# Patient Record
Sex: MALE | Race: WHITE | NOT HISPANIC OR LATINO | Employment: FULL TIME | URBAN - METROPOLITAN AREA
[De-identification: names, ages, dates, MRNs, and addresses within clinical notes are randomized per-mention and may not be internally consistent; named-entity substitution may affect disease eponyms.]

---

## 2018-04-12 ENCOUNTER — RECORDS - HEALTHEAST (OUTPATIENT)
Dept: LAB | Facility: CLINIC | Age: 33
End: 2018-04-12

## 2018-04-12 LAB
ALBUMIN SERPL-MCNC: 4.3 G/DL (ref 3.5–5)
ALP SERPL-CCNC: 51 U/L (ref 45–120)
ALT SERPL W P-5'-P-CCNC: 29 U/L (ref 0–45)
ANION GAP SERPL CALCULATED.3IONS-SCNC: 14 MMOL/L (ref 5–18)
AST SERPL W P-5'-P-CCNC: 28 U/L (ref 0–40)
BILIRUB SERPL-MCNC: 0.6 MG/DL (ref 0–1)
BUN SERPL-MCNC: 14 MG/DL (ref 8–22)
CALCIUM SERPL-MCNC: 10.1 MG/DL (ref 8.5–10.5)
CHLORIDE BLD-SCNC: 101 MMOL/L (ref 98–107)
CHOLEST SERPL-MCNC: 219 MG/DL
CO2 SERPL-SCNC: 23 MMOL/L (ref 22–31)
CREAT SERPL-MCNC: 0.9 MG/DL (ref 0.7–1.3)
FASTING STATUS PATIENT QL REPORTED: ABNORMAL
GFR SERPL CREATININE-BSD FRML MDRD: >60 ML/MIN/1.73M2
GLUCOSE BLD-MCNC: 86 MG/DL (ref 70–125)
HDLC SERPL-MCNC: 71 MG/DL
LDLC SERPL CALC-MCNC: 120 MG/DL
POTASSIUM BLD-SCNC: 3.6 MMOL/L (ref 3.5–5)
PROT SERPL-MCNC: 8.4 G/DL (ref 6–8)
SODIUM SERPL-SCNC: 138 MMOL/L (ref 136–145)
TRIGL SERPL-MCNC: 139 MG/DL

## 2018-04-13 ENCOUNTER — RECORDS - HEALTHEAST (OUTPATIENT)
Dept: LAB | Facility: CLINIC | Age: 33
End: 2018-04-13

## 2018-04-17 LAB
ALBUMIN PERCENT: 63.3 % (ref 51–67)
ALBUMIN SERPL ELPH-MCNC: 5.3 G/DL (ref 3.2–4.7)
ALPHA 1 PERCENT: 2 % (ref 2–4)
ALPHA 2 PERCENT: 9.3 % (ref 5–13)
ALPHA1 GLOB SERPL ELPH-MCNC: 0.2 G/DL (ref 0.1–0.3)
ALPHA2 GLOB SERPL ELPH-MCNC: 0.8 G/DL (ref 0.4–0.9)
B-GLOBULIN SERPL ELPH-MCNC: 0.9 G/DL (ref 0.7–1.2)
BETA PERCENT: 10.7 % (ref 10–17)
GAMMA GLOB SERPL ELPH-MCNC: 1.2 G/DL (ref 0.6–1.4)
GAMMA GLOBULIN PERCENT: 14.7 % (ref 9–20)
PATH ICD:: ABNORMAL
PROT PATTERN SERPL ELPH-IMP: ABNORMAL
PROT SERPL-MCNC: 8.4 G/DL (ref 6–8)
REVIEWING PATHOLOGIST: ABNORMAL

## 2019-08-19 ENCOUNTER — HOSPITAL ENCOUNTER (OUTPATIENT)
Facility: CLINIC | Age: 34
End: 2019-08-19
Attending: ORTHOPAEDIC SURGERY | Admitting: ORTHOPAEDIC SURGERY
Payer: COMMERCIAL

## 2019-08-19 ENCOUNTER — RECORDS - HEALTHEAST (OUTPATIENT)
Dept: LAB | Facility: CLINIC | Age: 34
End: 2019-08-19

## 2019-08-19 LAB
ANION GAP SERPL CALCULATED.3IONS-SCNC: 10 MMOL/L (ref 5–18)
BUN SERPL-MCNC: 6 MG/DL (ref 8–22)
CALCIUM SERPL-MCNC: 10 MG/DL (ref 8.5–10.5)
CHLORIDE BLD-SCNC: 103 MMOL/L (ref 98–107)
CO2 SERPL-SCNC: 25 MMOL/L (ref 22–31)
CREAT SERPL-MCNC: 0.79 MG/DL (ref 0.7–1.3)
GFR SERPL CREATININE-BSD FRML MDRD: >60 ML/MIN/1.73M2
GLUCOSE BLD-MCNC: 105 MG/DL (ref 70–125)
POTASSIUM BLD-SCNC: 4.1 MMOL/L (ref 3.5–5)
SODIUM SERPL-SCNC: 138 MMOL/L (ref 136–145)

## 2019-08-20 ENCOUNTER — ANESTHESIA EVENT (OUTPATIENT)
Dept: SURGERY | Facility: CLINIC | Age: 34
End: 2019-08-20

## 2019-08-20 RX ORDER — OXYCODONE HYDROCHLORIDE 5 MG/1
5 CAPSULE ORAL EVERY 4 HOURS PRN
COMMUNITY
End: 2019-08-21 | Stop reason: HOSPADM

## 2019-08-20 RX ORDER — OMEGA-3 FATTY ACIDS/FISH OIL 300-1000MG
200 CAPSULE ORAL EVERY 4 HOURS PRN
COMMUNITY
End: 2019-08-21 | Stop reason: HOSPADM

## 2019-08-20 RX ORDER — ACETAMINOPHEN 500 MG
500-1000 TABLET ORAL EVERY 6 HOURS PRN
COMMUNITY
End: 2019-08-21 | Stop reason: HOSPADM

## 2019-08-20 RX ORDER — GABAPENTIN 300 MG/1
300 CAPSULE ORAL ONCE
Status: CANCELLED | OUTPATIENT
Start: 2019-08-20 | End: 2019-08-20

## 2019-08-20 RX ORDER — ACETAMINOPHEN 325 MG/1
975 TABLET ORAL ONCE
Status: CANCELLED | OUTPATIENT
Start: 2019-08-20 | End: 2019-08-20

## 2019-08-20 RX ORDER — LIDOCAINE 40 MG/G
CREAM TOPICAL
Status: CANCELLED | OUTPATIENT
Start: 2019-08-20

## 2019-08-20 RX ORDER — SODIUM CHLORIDE, SODIUM LACTATE, POTASSIUM CHLORIDE, CALCIUM CHLORIDE 600; 310; 30; 20 MG/100ML; MG/100ML; MG/100ML; MG/100ML
INJECTION, SOLUTION INTRAVENOUS CONTINUOUS
Status: CANCELLED | OUTPATIENT
Start: 2019-08-20

## 2019-08-20 NOTE — ANESTHESIA PREPROCEDURE EVALUATION
Anesthesia Pre-Procedure Evaluation    Patient: Deangelo Leahy   MRN: [id number] : 1985          Preoperative Diagnosis: tibial plateau fracture    Procedure(s):  OPEN REDUCTION INTERNAL FIXATION of TIBIA  Possible KNEE Arthroscopy    No past medical history on file.  No past surgical history on file.    Anesthesia Evaluation     .             ROS/MED HX    ENT/Pulmonary:     (+)asthma , . .    Neurologic:     (+)other neuro insomnia    Cardiovascular:     (+) hypertension----. : . . . :. .       METS/Exercise Tolerance:     Hematologic:         Musculoskeletal:   (+) fracture lower extremity: Other (Comment) (left tibial plateau fracture), -       GI/Hepatic:         Renal/Genitourinary:         Endo:         Psychiatric:         Infectious Disease:         Malignancy:         Other:                                 No results found for: WBC, HGB, HCT, PLT, CRP, SED, NA, POTASSIUM, CHLORIDE, CO2, BUN, CR, GLC, JUAN RAMON, PHOS, MAG, ALBUMIN, PROTTOTAL, ALT, AST, GGT, ALKPHOS, BILITOTAL, BILIDIRECT, LIPASE, AMYLASE, ALEX, PTT, INR, FIBR, TSH, T4, T3, HCG, HCGS, CKTOTAL, CKMB, TROPN    Preop Vitals  BP Readings from Last 3 Encounters:   No data found for BP    Pulse Readings from Last 3 Encounters:   No data found for Pulse      Resp Readings from Last 3 Encounters:   No data found for Resp    SpO2 Readings from Last 3 Encounters:   No data found for SpO2      Temp Readings from Last 1 Encounters:   No data found for Temp    Ht Readings from Last 1 Encounters:   No data found for Ht      Wt Readings from Last 1 Encounters:   No data found for Wt    There is no height or weight on file to calculate BMI.                   GIBRAN Gant CRNA

## 2019-08-21 ENCOUNTER — ANESTHESIA (OUTPATIENT)
Dept: SURGERY | Facility: CLINIC | Age: 34
End: 2019-08-21

## 2019-08-21 ENCOUNTER — TRANSFERRED RECORDS (OUTPATIENT)
Dept: SURGERY | Facility: CLINIC | Age: 34
End: 2019-08-21

## 2019-08-29 RX ORDER — CIPROFLOXACIN 500 MG/1
500 TABLET, FILM COATED ORAL 2 TIMES DAILY
Status: ON HOLD | COMMUNITY
End: 2019-08-30

## 2019-08-29 RX ORDER — MULTIPLE VITAMINS W/ MINERALS TAB 9MG-400MCG
1 TAB ORAL DAILY
COMMUNITY

## 2019-08-29 RX ORDER — IBUPROFEN 200 MG
200 TABLET ORAL EVERY 6 HOURS PRN
Status: ON HOLD | COMMUNITY
End: 2019-08-31

## 2019-08-30 ENCOUNTER — ANESTHESIA EVENT (OUTPATIENT)
Dept: SURGERY | Facility: CLINIC | Age: 34
End: 2019-08-30
Payer: COMMERCIAL

## 2019-08-30 ENCOUNTER — HOSPITAL ENCOUNTER (OUTPATIENT)
Facility: CLINIC | Age: 34
Setting detail: OBSERVATION
Discharge: HOME | End: 2019-08-31
Attending: ORTHOPAEDIC SURGERY | Admitting: ORTHOPAEDIC SURGERY
Payer: COMMERCIAL

## 2019-08-30 ENCOUNTER — APPOINTMENT (OUTPATIENT)
Dept: GENERAL RADIOLOGY | Facility: CLINIC | Age: 34
End: 2019-08-30
Attending: ORTHOPAEDIC SURGERY
Payer: COMMERCIAL

## 2019-08-30 ENCOUNTER — APPOINTMENT (OUTPATIENT)
Dept: GENERAL RADIOLOGY | Facility: CLINIC | Age: 34
End: 2019-08-30
Attending: PHYSICIAN ASSISTANT
Payer: COMMERCIAL

## 2019-08-30 ENCOUNTER — ANESTHESIA (OUTPATIENT)
Dept: SURGERY | Facility: CLINIC | Age: 34
End: 2019-08-30
Payer: COMMERCIAL

## 2019-08-30 DIAGNOSIS — S82.142A CLOSED FRACTURE OF LEFT TIBIAL PLATEAU, INITIAL ENCOUNTER: Primary | ICD-10-CM

## 2019-08-30 PROBLEM — S82.143A TIBIAL PLATEAU FRACTURE: Status: ACTIVE | Noted: 2019-08-30

## 2019-08-30 PROCEDURE — 37000008 ZZH ANESTHESIA TECHNICAL FEE, 1ST 30 MIN: Performed by: ORTHOPAEDIC SURGERY

## 2019-08-30 PROCEDURE — 25000125 ZZHC RX 250: Performed by: ORTHOPAEDIC SURGERY

## 2019-08-30 PROCEDURE — 25000128 H RX IP 250 OP 636: Performed by: ANESTHESIOLOGY

## 2019-08-30 PROCEDURE — 25000125 ZZHC RX 250: Performed by: NURSE ANESTHETIST, CERTIFIED REGISTERED

## 2019-08-30 PROCEDURE — 40000985 XR KNEE LT 1/2 VW: Mod: 50

## 2019-08-30 PROCEDURE — 40000985 XR KNEE PORT RT 1/2 VW: Mod: RT

## 2019-08-30 PROCEDURE — 37000009 ZZH ANESTHESIA TECHNICAL FEE, EACH ADDTL 15 MIN: Performed by: ORTHOPAEDIC SURGERY

## 2019-08-30 PROCEDURE — 25800030 ZZH RX IP 258 OP 636: Performed by: NURSE ANESTHETIST, CERTIFIED REGISTERED

## 2019-08-30 PROCEDURE — 27211024 ZZHC OR SUPPLY OTHER OPNP: Performed by: ORTHOPAEDIC SURGERY

## 2019-08-30 PROCEDURE — 36000065 ZZH SURGERY LEVEL 4 W FLUORO 1ST 30 MIN: Performed by: ORTHOPAEDIC SURGERY

## 2019-08-30 PROCEDURE — 25000132 ZZH RX MED GY IP 250 OP 250 PS 637: Performed by: PHYSICIAN ASSISTANT

## 2019-08-30 PROCEDURE — C1713 ANCHOR/SCREW BN/BN,TIS/BN: HCPCS | Performed by: ORTHOPAEDIC SURGERY

## 2019-08-30 PROCEDURE — 36000063 ZZH SURGERY LEVEL 4 EA 15 ADDTL MIN: Performed by: ORTHOPAEDIC SURGERY

## 2019-08-30 PROCEDURE — C1762 CONN TISS, HUMAN(INC FASCIA): HCPCS | Performed by: ORTHOPAEDIC SURGERY

## 2019-08-30 PROCEDURE — 25000128 H RX IP 250 OP 636: Performed by: NURSE ANESTHETIST, CERTIFIED REGISTERED

## 2019-08-30 PROCEDURE — 71000012 ZZH RECOVERY PHASE 1 LEVEL 1 FIRST HR: Performed by: ORTHOPAEDIC SURGERY

## 2019-08-30 PROCEDURE — 25000132 ZZH RX MED GY IP 250 OP 250 PS 637: Performed by: NURSE ANESTHETIST, CERTIFIED REGISTERED

## 2019-08-30 PROCEDURE — 25000128 H RX IP 250 OP 636: Performed by: PHYSICIAN ASSISTANT

## 2019-08-30 PROCEDURE — 25800030 ZZH RX IP 258 OP 636: Performed by: ANESTHESIOLOGY

## 2019-08-30 PROCEDURE — 27210794 ZZH OR GENERAL SUPPLY STERILE: Performed by: ORTHOPAEDIC SURGERY

## 2019-08-30 PROCEDURE — 40000171 ZZH STATISTIC PRE-PROCEDURE ASSESSMENT III: Performed by: ORTHOPAEDIC SURGERY

## 2019-08-30 PROCEDURE — 71000013 ZZH RECOVERY PHASE 1 LEVEL 1 EA ADDTL HR: Performed by: ORTHOPAEDIC SURGERY

## 2019-08-30 PROCEDURE — G0378 HOSPITAL OBSERVATION PER HR: HCPCS

## 2019-08-30 PROCEDURE — 40000277 XR SURGERY CARM FLUORO LESS THAN 5 MIN W STILLS

## 2019-08-30 DEVICE — IMP SCR SYN CORTEX 3.5X40MM SELF TAP SS 204.840: Type: IMPLANTABLE DEVICE | Site: TIBIA | Status: FUNCTIONAL

## 2019-08-30 DEVICE — IMPLANTABLE DEVICE: Type: IMPLANTABLE DEVICE | Site: TIBIA | Status: FUNCTIONAL

## 2019-08-30 DEVICE — IMP SCR SYN CORTEX 3.5X32MM SELF TAP SS 204.832: Type: IMPLANTABLE DEVICE | Site: TIBIA | Status: FUNCTIONAL

## 2019-08-30 DEVICE — IMP SCR SYN 3.5X20MM LOCKING W/STARDRIVE SS 212.106: Type: IMPLANTABLE DEVICE | Site: TIBIA | Status: FUNCTIONAL

## 2019-08-30 DEVICE — IMP SCR SYN CORTEX 3.5X36MM SELF TAP SS 204.836: Type: IMPLANTABLE DEVICE | Site: TIBIA | Status: FUNCTIONAL

## 2019-08-30 DEVICE — GRAFT BONE CHIPS CANC 15ML 400145: Type: IMPLANTABLE DEVICE | Site: TIBIA | Status: FUNCTIONAL

## 2019-08-30 RX ORDER — NALOXONE HYDROCHLORIDE 0.4 MG/ML
.1-.4 INJECTION, SOLUTION INTRAMUSCULAR; INTRAVENOUS; SUBCUTANEOUS
Status: DISCONTINUED | OUTPATIENT
Start: 2019-08-30 | End: 2019-08-30

## 2019-08-30 RX ORDER — ONDANSETRON 2 MG/ML
4 INJECTION INTRAMUSCULAR; INTRAVENOUS EVERY 30 MIN PRN
Status: DISCONTINUED | OUTPATIENT
Start: 2019-08-30 | End: 2019-08-30 | Stop reason: HOSPADM

## 2019-08-30 RX ORDER — DEXAMETHASONE SODIUM PHOSPHATE 4 MG/ML
4 INJECTION, SOLUTION INTRA-ARTICULAR; INTRALESIONAL; INTRAMUSCULAR; INTRAVENOUS; SOFT TISSUE EVERY 10 MIN PRN
Status: DISCONTINUED | OUTPATIENT
Start: 2019-08-30 | End: 2019-08-30 | Stop reason: HOSPADM

## 2019-08-30 RX ORDER — HYDROXYZINE HYDROCHLORIDE 25 MG/1
25 TABLET, FILM COATED ORAL EVERY 6 HOURS PRN
Status: DISCONTINUED | OUTPATIENT
Start: 2019-08-30 | End: 2019-08-31 | Stop reason: HOSPADM

## 2019-08-30 RX ORDER — OXYCODONE HYDROCHLORIDE 5 MG/1
5-10 TABLET ORAL
Status: DISCONTINUED | OUTPATIENT
Start: 2019-08-30 | End: 2019-08-31 | Stop reason: HOSPADM

## 2019-08-30 RX ORDER — PROCHLORPERAZINE MALEATE 5 MG
10 TABLET ORAL EVERY 6 HOURS PRN
Status: DISCONTINUED | OUTPATIENT
Start: 2019-08-30 | End: 2019-08-31 | Stop reason: HOSPADM

## 2019-08-30 RX ORDER — ONDANSETRON 4 MG/1
4 TABLET, ORALLY DISINTEGRATING ORAL EVERY 6 HOURS PRN
Status: DISCONTINUED | OUTPATIENT
Start: 2019-08-30 | End: 2019-08-31 | Stop reason: HOSPADM

## 2019-08-30 RX ORDER — BUPIVACAINE HYDROCHLORIDE 7.5 MG/ML
INJECTION, SOLUTION INTRASPINAL PRN
Status: DISCONTINUED | OUTPATIENT
Start: 2019-08-30 | End: 2019-08-30

## 2019-08-30 RX ORDER — METHOCARBAMOL 750 MG/1
750 TABLET, FILM COATED ORAL 4 TIMES DAILY PRN
Status: DISCONTINUED | OUTPATIENT
Start: 2019-08-30 | End: 2019-08-31 | Stop reason: HOSPADM

## 2019-08-30 RX ORDER — SODIUM CHLORIDE, SODIUM LACTATE, POTASSIUM CHLORIDE, CALCIUM CHLORIDE 600; 310; 30; 20 MG/100ML; MG/100ML; MG/100ML; MG/100ML
INJECTION, SOLUTION INTRAVENOUS CONTINUOUS
Status: DISCONTINUED | OUTPATIENT
Start: 2019-08-30 | End: 2019-08-30 | Stop reason: HOSPADM

## 2019-08-30 RX ORDER — ACETAMINOPHEN 500 MG
500-1000 TABLET ORAL EVERY 6 HOURS PRN
COMMUNITY

## 2019-08-30 RX ORDER — HYDRALAZINE HYDROCHLORIDE 20 MG/ML
2.5-5 INJECTION INTRAMUSCULAR; INTRAVENOUS EVERY 10 MIN PRN
Status: DISCONTINUED | OUTPATIENT
Start: 2019-08-30 | End: 2019-08-30 | Stop reason: HOSPADM

## 2019-08-30 RX ORDER — LABETALOL HYDROCHLORIDE 5 MG/ML
10 INJECTION, SOLUTION INTRAVENOUS
Status: DISCONTINUED | OUTPATIENT
Start: 2019-08-30 | End: 2019-08-30 | Stop reason: HOSPADM

## 2019-08-30 RX ORDER — FENTANYL CITRATE 50 UG/ML
25-50 INJECTION, SOLUTION INTRAMUSCULAR; INTRAVENOUS
Status: DISCONTINUED | OUTPATIENT
Start: 2019-08-30 | End: 2019-08-30 | Stop reason: HOSPADM

## 2019-08-30 RX ORDER — PROPOFOL 10 MG/ML
INJECTION, EMULSION INTRAVENOUS CONTINUOUS PRN
Status: DISCONTINUED | OUTPATIENT
Start: 2019-08-30 | End: 2019-08-30

## 2019-08-30 RX ORDER — DEXAMETHASONE SODIUM PHOSPHATE 4 MG/ML
4 INJECTION, SOLUTION INTRA-ARTICULAR; INTRALESIONAL; INTRAMUSCULAR; INTRAVENOUS; SOFT TISSUE
Status: DISCONTINUED | OUTPATIENT
Start: 2019-08-30 | End: 2019-08-30 | Stop reason: HOSPADM

## 2019-08-30 RX ORDER — ONDANSETRON 2 MG/ML
INJECTION INTRAMUSCULAR; INTRAVENOUS PRN
Status: DISCONTINUED | OUTPATIENT
Start: 2019-08-30 | End: 2019-08-30

## 2019-08-30 RX ORDER — NALOXONE HYDROCHLORIDE 0.4 MG/ML
.1-.4 INJECTION, SOLUTION INTRAMUSCULAR; INTRAVENOUS; SUBCUTANEOUS
Status: DISCONTINUED | OUTPATIENT
Start: 2019-08-30 | End: 2019-08-30 | Stop reason: HOSPADM

## 2019-08-30 RX ORDER — CEFAZOLIN SODIUM 2 G/100ML
2 INJECTION, SOLUTION INTRAVENOUS
Status: COMPLETED | OUTPATIENT
Start: 2019-08-30 | End: 2019-08-30

## 2019-08-30 RX ORDER — FENTANYL CITRATE 50 UG/ML
100 INJECTION, SOLUTION INTRAMUSCULAR; INTRAVENOUS ONCE
Status: COMPLETED | OUTPATIENT
Start: 2019-08-30 | End: 2019-08-30

## 2019-08-30 RX ORDER — ONDANSETRON 4 MG/1
4 TABLET, ORALLY DISINTEGRATING ORAL EVERY 30 MIN PRN
Status: DISCONTINUED | OUTPATIENT
Start: 2019-08-30 | End: 2019-08-30 | Stop reason: HOSPADM

## 2019-08-30 RX ORDER — CEFAZOLIN SODIUM 1 G/3ML
1 INJECTION, POWDER, FOR SOLUTION INTRAMUSCULAR; INTRAVENOUS SEE ADMIN INSTRUCTIONS
Status: DISCONTINUED | OUTPATIENT
Start: 2019-08-30 | End: 2019-08-30 | Stop reason: HOSPADM

## 2019-08-30 RX ORDER — LIDOCAINE 40 MG/G
CREAM TOPICAL
Status: DISCONTINUED | OUTPATIENT
Start: 2019-08-30 | End: 2019-08-30 | Stop reason: HOSPADM

## 2019-08-30 RX ORDER — MEPERIDINE HYDROCHLORIDE 25 MG/ML
12.5 INJECTION INTRAMUSCULAR; INTRAVENOUS; SUBCUTANEOUS
Status: DISCONTINUED | OUTPATIENT
Start: 2019-08-30 | End: 2019-08-30 | Stop reason: HOSPADM

## 2019-08-30 RX ORDER — FENTANYL CITRATE 0.05 MG/ML
25-50 INJECTION, SOLUTION INTRAMUSCULAR; INTRAVENOUS
Status: DISCONTINUED | OUTPATIENT
Start: 2019-08-30 | End: 2019-08-30 | Stop reason: HOSPADM

## 2019-08-30 RX ORDER — METOCLOPRAMIDE 5 MG/1
10 TABLET ORAL EVERY 6 HOURS PRN
Status: DISCONTINUED | OUTPATIENT
Start: 2019-08-30 | End: 2019-08-31 | Stop reason: HOSPADM

## 2019-08-30 RX ORDER — HYDROMORPHONE HYDROCHLORIDE 1 MG/ML
.3-.5 INJECTION, SOLUTION INTRAMUSCULAR; INTRAVENOUS; SUBCUTANEOUS EVERY 5 MIN PRN
Status: DISCONTINUED | OUTPATIENT
Start: 2019-08-30 | End: 2019-08-30 | Stop reason: HOSPADM

## 2019-08-30 RX ORDER — NALOXONE HYDROCHLORIDE 0.4 MG/ML
.1-.4 INJECTION, SOLUTION INTRAMUSCULAR; INTRAVENOUS; SUBCUTANEOUS
Status: DISCONTINUED | OUTPATIENT
Start: 2019-08-30 | End: 2019-08-31 | Stop reason: HOSPADM

## 2019-08-30 RX ORDER — HYDROMORPHONE HYDROCHLORIDE 1 MG/ML
.3-.5 INJECTION, SOLUTION INTRAMUSCULAR; INTRAVENOUS; SUBCUTANEOUS
Status: DISCONTINUED | OUTPATIENT
Start: 2019-08-30 | End: 2019-08-31 | Stop reason: HOSPADM

## 2019-08-30 RX ORDER — SODIUM CHLORIDE 9 MG/ML
INJECTION, SOLUTION INTRAVENOUS CONTINUOUS
Status: DISCONTINUED | OUTPATIENT
Start: 2019-08-30 | End: 2019-08-31 | Stop reason: HOSPADM

## 2019-08-30 RX ORDER — DEXAMETHASONE SODIUM PHOSPHATE 4 MG/ML
INJECTION, SOLUTION INTRA-ARTICULAR; INTRALESIONAL; INTRAMUSCULAR; INTRAVENOUS; SOFT TISSUE PRN
Status: DISCONTINUED | OUTPATIENT
Start: 2019-08-30 | End: 2019-08-30

## 2019-08-30 RX ORDER — ACETAMINOPHEN 325 MG/1
975 TABLET ORAL EVERY 8 HOURS
Status: DISCONTINUED | OUTPATIENT
Start: 2019-08-30 | End: 2019-08-31 | Stop reason: HOSPADM

## 2019-08-30 RX ORDER — AMOXICILLIN 250 MG
2 CAPSULE ORAL 2 TIMES DAILY
Status: DISCONTINUED | OUTPATIENT
Start: 2019-08-30 | End: 2019-08-31 | Stop reason: HOSPADM

## 2019-08-30 RX ORDER — MULTIPLE VITAMINS W/ MINERALS TAB 9MG-400MCG
1 TAB ORAL DAILY
Status: DISCONTINUED | OUTPATIENT
Start: 2019-08-30 | End: 2019-08-31 | Stop reason: HOSPADM

## 2019-08-30 RX ORDER — MAGNESIUM HYDROXIDE 1200 MG/15ML
LIQUID ORAL PRN
Status: DISCONTINUED | OUTPATIENT
Start: 2019-08-30 | End: 2019-08-30 | Stop reason: HOSPADM

## 2019-08-30 RX ORDER — ACETAMINOPHEN 325 MG/1
650 TABLET ORAL EVERY 4 HOURS PRN
Status: DISCONTINUED | OUTPATIENT
Start: 2019-09-02 | End: 2019-08-31 | Stop reason: HOSPADM

## 2019-08-30 RX ORDER — HYDROMORPHONE HYDROCHLORIDE 1 MG/ML
.3-.5 INJECTION, SOLUTION INTRAMUSCULAR; INTRAVENOUS; SUBCUTANEOUS EVERY 10 MIN PRN
Status: DISCONTINUED | OUTPATIENT
Start: 2019-08-30 | End: 2019-08-30 | Stop reason: HOSPADM

## 2019-08-30 RX ORDER — CEFAZOLIN SODIUM 2 G/100ML
2 INJECTION, SOLUTION INTRAVENOUS EVERY 8 HOURS
Status: COMPLETED | OUTPATIENT
Start: 2019-08-30 | End: 2019-08-31

## 2019-08-30 RX ORDER — ONDANSETRON 2 MG/ML
4 INJECTION INTRAMUSCULAR; INTRAVENOUS EVERY 6 HOURS PRN
Status: DISCONTINUED | OUTPATIENT
Start: 2019-08-30 | End: 2019-08-31 | Stop reason: HOSPADM

## 2019-08-30 RX ORDER — LIDOCAINE 40 MG/G
CREAM TOPICAL
Status: DISCONTINUED | OUTPATIENT
Start: 2019-08-30 | End: 2019-08-31 | Stop reason: HOSPADM

## 2019-08-30 RX ORDER — ACETAMINOPHEN 325 MG/1
975 TABLET ORAL ONCE
Status: DISCONTINUED | OUTPATIENT
Start: 2019-08-30 | End: 2019-08-30 | Stop reason: HOSPADM

## 2019-08-30 RX ORDER — AMOXICILLIN 250 MG
1 CAPSULE ORAL 2 TIMES DAILY
Status: DISCONTINUED | OUTPATIENT
Start: 2019-08-30 | End: 2019-08-31 | Stop reason: HOSPADM

## 2019-08-30 RX ORDER — CEFAZOLIN SODIUM 1 G/3ML
INJECTION, POWDER, FOR SOLUTION INTRAMUSCULAR; INTRAVENOUS PRN
Status: DISCONTINUED | OUTPATIENT
Start: 2019-08-30 | End: 2019-08-30

## 2019-08-30 RX ORDER — SODIUM CHLORIDE, SODIUM LACTATE, POTASSIUM CHLORIDE, CALCIUM CHLORIDE 600; 310; 30; 20 MG/100ML; MG/100ML; MG/100ML; MG/100ML
INJECTION, SOLUTION INTRAVENOUS CONTINUOUS
Status: DISCONTINUED | OUTPATIENT
Start: 2019-08-30 | End: 2019-08-31 | Stop reason: HOSPADM

## 2019-08-30 RX ORDER — METOCLOPRAMIDE HYDROCHLORIDE 5 MG/ML
10 INJECTION INTRAMUSCULAR; INTRAVENOUS EVERY 6 HOURS PRN
Status: DISCONTINUED | OUTPATIENT
Start: 2019-08-30 | End: 2019-08-31 | Stop reason: HOSPADM

## 2019-08-30 RX ORDER — PROPOFOL 10 MG/ML
INJECTION, EMULSION INTRAVENOUS PRN
Status: DISCONTINUED | OUTPATIENT
Start: 2019-08-30 | End: 2019-08-30

## 2019-08-30 RX ORDER — FENTANYL CITRATE 50 UG/ML
INJECTION, SOLUTION INTRAMUSCULAR; INTRAVENOUS PRN
Status: DISCONTINUED | OUTPATIENT
Start: 2019-08-30 | End: 2019-08-30

## 2019-08-30 RX ORDER — GABAPENTIN 300 MG/1
300 CAPSULE ORAL ONCE
Status: COMPLETED | OUTPATIENT
Start: 2019-08-30 | End: 2019-08-30

## 2019-08-30 RX ADMIN — FENTANYL CITRATE 50 MCG: 0.05 INJECTION, SOLUTION INTRAMUSCULAR; INTRAVENOUS at 10:08

## 2019-08-30 RX ADMIN — HYDROMORPHONE HYDROCHLORIDE 0.5 MG: 1 INJECTION, SOLUTION INTRAMUSCULAR; INTRAVENOUS; SUBCUTANEOUS at 22:58

## 2019-08-30 RX ADMIN — DEXAMETHASONE SODIUM PHOSPHATE 4 MG: 4 INJECTION, SOLUTION INTRA-ARTICULAR; INTRALESIONAL; INTRAMUSCULAR; INTRAVENOUS; SOFT TISSUE at 10:44

## 2019-08-30 RX ADMIN — METHOCARBAMOL TABLETS 750 MG: 750 TABLET, COATED ORAL at 17:06

## 2019-08-30 RX ADMIN — SODIUM CHLORIDE, POTASSIUM CHLORIDE, SODIUM LACTATE AND CALCIUM CHLORIDE: 600; 310; 30; 20 INJECTION, SOLUTION INTRAVENOUS at 08:56

## 2019-08-30 RX ADMIN — HYDROXYZINE HYDROCHLORIDE 25 MG: 25 TABLET, FILM COATED ORAL at 15:31

## 2019-08-30 RX ADMIN — DEXMEDETOMIDINE HYDROCHLORIDE 8 MCG: 100 INJECTION, SOLUTION INTRAVENOUS at 12:31

## 2019-08-30 RX ADMIN — CEFAZOLIN SODIUM 2 G: 2 INJECTION, SOLUTION INTRAVENOUS at 22:00

## 2019-08-30 RX ADMIN — OXYCODONE HYDROCHLORIDE 10 MG: 5 TABLET ORAL at 21:54

## 2019-08-30 RX ADMIN — MIDAZOLAM 1 MG: 1 INJECTION INTRAMUSCULAR; INTRAVENOUS at 10:28

## 2019-08-30 RX ADMIN — SENNOSIDES AND DOCUSATE SODIUM 2 TABLET: 8.6; 5 TABLET ORAL at 21:53

## 2019-08-30 RX ADMIN — ONDANSETRON 4 MG: 2 INJECTION INTRAMUSCULAR; INTRAVENOUS at 13:38

## 2019-08-30 RX ADMIN — PROPOFOL 125 MCG/KG/MIN: 10 INJECTION, EMULSION INTRAVENOUS at 11:17

## 2019-08-30 RX ADMIN — ACETAMINOPHEN 975 MG: 325 TABLET ORAL at 17:06

## 2019-08-30 RX ADMIN — MIDAZOLAM 1 MG: 1 INJECTION INTRAMUSCULAR; INTRAVENOUS at 10:39

## 2019-08-30 RX ADMIN — FENTANYL CITRATE 25 MCG: 50 INJECTION, SOLUTION INTRAMUSCULAR; INTRAVENOUS at 12:27

## 2019-08-30 RX ADMIN — SODIUM CHLORIDE, POTASSIUM CHLORIDE, SODIUM LACTATE AND CALCIUM CHLORIDE: 600; 310; 30; 20 INJECTION, SOLUTION INTRAVENOUS at 11:39

## 2019-08-30 RX ADMIN — HYDROMORPHONE HYDROCHLORIDE 0.5 MG: 1 INJECTION, SOLUTION INTRAMUSCULAR; INTRAVENOUS; SUBCUTANEOUS at 17:05

## 2019-08-30 RX ADMIN — HYDROMORPHONE HYDROCHLORIDE 0.5 MG: 1 INJECTION, SOLUTION INTRAMUSCULAR; INTRAVENOUS; SUBCUTANEOUS at 15:01

## 2019-08-30 RX ADMIN — FENTANYL CITRATE 50 MCG: 0.05 INJECTION, SOLUTION INTRAMUSCULAR; INTRAVENOUS at 14:22

## 2019-08-30 RX ADMIN — PROPOFOL 100 MCG/KG/MIN: 10 INJECTION, EMULSION INTRAVENOUS at 13:16

## 2019-08-30 RX ADMIN — DEXMEDETOMIDINE HYDROCHLORIDE 10 MCG: 100 INJECTION, SOLUTION INTRAVENOUS at 11:16

## 2019-08-30 RX ADMIN — PROPOFOL 20 MG: 10 INJECTION, EMULSION INTRAVENOUS at 10:46

## 2019-08-30 RX ADMIN — MIDAZOLAM HYDROCHLORIDE 2 MG: 1 INJECTION, SOLUTION INTRAMUSCULAR; INTRAVENOUS at 10:08

## 2019-08-30 RX ADMIN — ACETAMINOPHEN 975 MG: 325 TABLET ORAL at 23:42

## 2019-08-30 RX ADMIN — FENTANYL CITRATE 25 MCG: 50 INJECTION, SOLUTION INTRAMUSCULAR; INTRAVENOUS at 12:10

## 2019-08-30 RX ADMIN — PROPOFOL 10 MG: 10 INJECTION, EMULSION INTRAVENOUS at 10:50

## 2019-08-30 RX ADMIN — PROPOFOL 125 MCG/KG/MIN: 10 INJECTION, EMULSION INTRAVENOUS at 12:45

## 2019-08-30 RX ADMIN — OXYCODONE HYDROCHLORIDE 5 MG: 5 TABLET ORAL at 18:40

## 2019-08-30 RX ADMIN — FENTANYL CITRATE 50 MCG: 0.05 INJECTION, SOLUTION INTRAMUSCULAR; INTRAVENOUS at 10:12

## 2019-08-30 RX ADMIN — PROPOFOL 115 MCG/KG/MIN: 10 INJECTION, EMULSION INTRAVENOUS at 11:44

## 2019-08-30 RX ADMIN — MULTIPLE VITAMINS W/ MINERALS TAB 1 TABLET: TAB at 17:06

## 2019-08-30 RX ADMIN — CEFAZOLIN 1 G: 1 INJECTION, POWDER, FOR SOLUTION INTRAMUSCULAR; INTRAVENOUS at 12:44

## 2019-08-30 RX ADMIN — BUPIVACAINE HYDROCHLORIDE IN DEXTROSE 12.5 MG: 7.5 INJECTION, SOLUTION SUBARACHNOID at 10:41

## 2019-08-30 RX ADMIN — PROPOFOL 100 MCG/KG/MIN: 10 INJECTION, EMULSION INTRAVENOUS at 10:42

## 2019-08-30 RX ADMIN — HYDROMORPHONE HYDROCHLORIDE 0.5 MG: 1 INJECTION, SOLUTION INTRAMUSCULAR; INTRAVENOUS; SUBCUTANEOUS at 14:36

## 2019-08-30 RX ADMIN — DEXMEDETOMIDINE HYDROCHLORIDE 10 MCG: 100 INJECTION, SOLUTION INTRAVENOUS at 10:47

## 2019-08-30 RX ADMIN — GABAPENTIN 300 MG: 300 CAPSULE ORAL at 08:53

## 2019-08-30 RX ADMIN — FENTANYL CITRATE 25 MCG: 50 INJECTION, SOLUTION INTRAMUSCULAR; INTRAVENOUS at 10:33

## 2019-08-30 RX ADMIN — CEFAZOLIN SODIUM 2 G: 2 INJECTION, SOLUTION INTRAVENOUS at 10:44

## 2019-08-30 RX ADMIN — METHOCARBAMOL TABLETS 750 MG: 750 TABLET, COATED ORAL at 23:42

## 2019-08-30 RX ADMIN — ASPIRIN 325 MG: 325 TABLET, DELAYED RELEASE ORAL at 17:06

## 2019-08-30 RX ADMIN — FENTANYL CITRATE 25 MCG: 50 INJECTION, SOLUTION INTRAMUSCULAR; INTRAVENOUS at 12:31

## 2019-08-30 ASSESSMENT — MIFFLIN-ST. JEOR: SCORE: 1887.46

## 2019-08-30 NOTE — ANESTHESIA POSTPROCEDURE EVALUATION
Patient: Deangelo Leahy    Procedure(s):  OPEN REDUCTION INTERNAL FIXATION LEFT TIBIA PLATEAU    Diagnosis:LEFT TIBIAL PLATEAU FRACTURE  Diagnosis Additional Information: No value filed.    Anesthesia Type:  No value filed.    Note:  Anesthesia Post Evaluation    Patient location during evaluation: PACU  Patient participation: Able to fully participate in evaluation  Level of consciousness: awake and alert  Pain management: adequate  Airway patency: patent  Cardiovascular status: acceptable  Respiratory status: acceptable and unassisted  Hydration status: acceptable  PONV: none             Last vitals:  Vitals:    08/30/19 1515 08/30/19 1530 08/30/19 1545   BP: (!) 141/85 (!) 148/105    Pulse: 79 75    Resp: 12 11 14   Temp: 37.3  C (99.1  F) 37.3  C (99.1  F)    SpO2: 99% 99% 96%         Electronically Signed By: Naga Thomas MD  August 30, 2019  3:49 PM

## 2019-08-30 NOTE — ANESTHESIA POSTPROCEDURE EVALUATION
Patient: Deangelo Leahy    Procedure(s):  OPEN REDUCTION INTERNAL FIXATION LEFT TIBIA PLATEAU    Diagnosis:LEFT TIBIAL PLATEAU FRACTURE  Diagnosis Additional Information: No value filed.    Anesthesia Type:  No value filed.    Note:  Anesthesia Post Evaluation    Patient location during evaluation: PACU  Patient participation: Able to fully participate in evaluation  Level of consciousness: awake and alert  Pain management: adequate  Airway patency: patent  Cardiovascular status: acceptable  Respiratory status: acceptable and unassisted  Hydration status: acceptable  PONV: none             Last vitals:  Vitals:    08/30/19 1515 08/30/19 1530 08/30/19 1545   BP: (!) 141/85 (!) 148/105    Pulse: 79 75    Resp: 12 11 14   Temp: 37.3  C (99.1  F) 37.3  C (99.1  F)    SpO2: 99% 99% 96%         Electronically Signed By: Naga Thomas MD  August 30, 2019  3:48 PM

## 2019-08-30 NOTE — ANESTHESIA PROCEDURE NOTES
Peripheral nerve/Neuraxial procedure note : intrathecal  Pre-Procedure  Performed by Naga Thomas MD  Location: OR      Pre-Anesthestic Checklist: patient identified, IV checked, site marked, risks and benefits discussed, informed consent, monitors and equipment checked, pre-op evaluation, at physician/surgeon's request and post-op pain management    Timeout  Correct Patient: Yes   Correct Procedure: Yes   Correct Site: Yes   Correct Laterality: N/A   Correct Position: Yes   Site Marked: N/A   .   Procedure Documentation    .    Procedure:    Intrathecal.  Insertion Site:L3-4  (midline approach)      Patient Prep;mask, sterile gloves, povidone-iodine 7.5% surgical scrub, patient draped.  .  Needle: Nolvia-Best Spinal Needle (gauge): 24  Spinal/LP Needle Length (inches): 3 # of attempts: 1 and # of redirects: : 0. Introducer used Introducer: 20 G .       Assessment/Narrative  Paresthesias: No.  .  .  clear CSF fluid removed . Comments:  12.5 mg 0.75% BUPIVACAINE WITH 8.25% DEXTROSE INJECTED. No paresthesia on injection. Patient tolerated the procedure well.

## 2019-08-30 NOTE — PROGRESS NOTES
1000hr - Spouse in family waiting room called and updated: pt still awaiting PNB, has just had Rt knee XR ordered preOp by Surgeon as pt c/o rt knee pain.  Pt spoke w/Spouse.

## 2019-08-30 NOTE — PROGRESS NOTES
Admission medication history interview status for the 8/30/2019  admission is complete. See EPIC admission navigator for prior to admission medications     Medication history source reliability:Good    Medication history interview source(s):Patient    Medication history resources (including written lists, pill bottles, clinic record):None    Primary pharmacy.[facility]    Additional medication history information not noted on PTA med list :None    Time spent in this activity: 30 minutes    Prior to Admission medications    Medication Sig Last Dose Taking? Auth Provider   acetaminophen (TYLENOL) 500 MG tablet Take 500-1,000 mg by mouth every 6 hours as needed for mild pain 8/30/2019 at 0530 Yes Reported, Patient   ibuprofen (ADVIL/MOTRIN) 200 MG tablet Take 200 mg by mouth every 6 hours as needed for mild pain 8/26/2019 at prn Yes Reported, Patient   magnesium 100 MG TABS Take 1 tablet by mouth daily  8/29/2019 at am Yes Reported, Patient   multivitamin w/minerals (MULTI-VITAMIN) tablet Take 1 tablet by mouth daily 8/29/2019 at pm Yes Reported, Patient

## 2019-08-30 NOTE — ANESTHESIA POSTPROCEDURE EVALUATION
Patient: Deangelo Leahy    Procedure(s):  OPEN REDUCTION INTERNAL FIXATION LEFT TIBIA PLATEAU    Diagnosis:LEFT TIBIAL PLATEAU FRACTURE  Diagnosis Additional Information: No value filed.    Anesthesia Type:  Spinal    Note:  Anesthesia Post Evaluation    Patient location during evaluation: PACU  Patient participation: Able to fully participate in evaluation  Level of consciousness: awake and alert  Pain management: adequate  Airway patency: patent  Cardiovascular status: acceptable  Respiratory status: acceptable and unassisted  Hydration status: acceptable  PONV: none             Last vitals:  Vitals:    08/30/19 1515 08/30/19 1530 08/30/19 1545   BP: (!) 141/85 (!) 148/105    Pulse: 79 75    Resp: 12 11 14   Temp: 37.3  C (99.1  F) 37.3  C (99.1  F)    SpO2: 99% 99% 96%         Electronically Signed By: Naga Thomas MD  August 30, 2019  3:49 PM

## 2019-08-30 NOTE — ANESTHESIA PREPROCEDURE EVALUATION
Anesthesia Pre-Procedure Evaluation    Patient: Deangelo Leahy   MRN: [id number] : 1985          Preoperative Diagnosis: LEFT TIBIAL PLATEAU FRACTURE    Procedure(s):  OPEN REDUCTION INTERNAL FIXATION LEFT TIBIA PLATEAU (C ARM,PATIENT SUPINE ON REGULAR BED, BONE FOAM RAMP,SYNTHES VA PROXIMAL LATERAL TIBIA PLATES,SYNTHES SMALL FRAG LOCKING, SMALL BONE POWER X 2)^    Past Medical History:   Diagnosis Date     Hypertension      Tibial plateau fracture 2019    LLE     Uncomplicated asthma      History reviewed. No pertinent surgical history.    Anesthesia Evaluation     . Pt has had prior anesthetic.     No history of anesthetic complications          ROS/MED HX    ENT/Pulmonary:      (-) asthma and sleep apnea   Neurologic:  - neg neurologic ROS     Cardiovascular:     (+) hypertension-range: not on medications, ---. : . . . :. .       METS/Exercise Tolerance:     Hematologic:         Musculoskeletal:         GI/Hepatic:  - neg GI/hepatic ROS      (-) GERD   Renal/Genitourinary:  - ROS Renal section negative       Endo:  - neg endo ROS       Psychiatric:         Infectious Disease:         Malignancy:         Other:                          Physical Exam  Normal systems: cardiovascular, pulmonary and dental    Airway   Mallampati: II  TM distance: >3 FB  Neck ROM: full    Dental     Cardiovascular       Pulmonary             No results found for: WBC, HGB, HCT, PLT, CRP, SED, NA, POTASSIUM, CHLORIDE, CO2, BUN, CR, GLC, JUAN RAMON, PHOS, MAG, ALBUMIN, PROTTOTAL, ALT, AST, GGT, ALKPHOS, BILITOTAL, BILIDIRECT, LIPASE, AMYLASE, ALEX, PTT, INR, FIBR, TSH, T4, T3, HCG, HCGS, CKTOTAL, CKMB, TROPN    Preop Vitals  BP Readings from Last 3 Encounters:   19 135/89    Pulse Readings from Last 3 Encounters:   No data found for Pulse      Resp Readings from Last 3 Encounters:   19 16    SpO2 Readings from Last 3 Encounters:   19 98%      Temp Readings from Last 1 Encounters:   19 36.3  C (97.4  " F) (Temporal)    Ht Readings from Last 1 Encounters:   08/30/19 1.778 m (5' 10\")      Wt Readings from Last 1 Encounters:   08/30/19 94.1 kg (207 lb 8 oz)    Estimated body mass index is 29.77 kg/m  as calculated from the following:    Height as of this encounter: 1.778 m (5' 10\").    Weight as of this encounter: 94.1 kg (207 lb 8 oz).       Anesthesia Plan      History & Physical Review  History and physical reviewed and following examination; no interval change.    ASA Status:  2 .    NPO Status:  > 8 hours    Plan for Spinal   PONV prophylaxis:  Ondansetron (or other 5HT-3)       Postoperative Care  Postoperative pain management:  Multi-modal analgesia.      Consents  Anesthetic plan, risks, benefits and alternatives discussed with:  Patient..                 Naga Thomas MD  "

## 2019-08-30 NOTE — OR NURSING
Report called to station 55 RN.  Pt. Transferred along with his glasses and belongings to station 55.

## 2019-08-30 NOTE — ANESTHESIA PROCEDURE NOTES
Peripheral nerve/Neuraxial procedure note : Femoral and Fascia iliaca  Pre-Procedure  Performed by Naga Thomas MD  Location: pre-op      Pre-Anesthestic Checklist: patient identified, IV checked, site marked, risks and benefits discussed, informed consent, monitors and equipment checked, pre-op evaluation, at physician/surgeon's request and post-op pain management    Timeout  Correct Patient: Yes   Correct Procedure: Yes   Correct Site: Yes   Correct Laterality: Yes   Correct Position: Yes   Site Marked: Yes   .   Procedure Documentation    .    Procedure:    Femoral and Fascia iliaca.  Local skin infiltrated with 3 mL of 1% lidocaine.     Ultrasound used to identify targeted nerve, plexus, or vascular marker and placed a needle adjacent to it., Ultrasound was used to visualize the spread of the anesthetic in close proximity to the above stated nerve. A permanent image is entered into the patient's record.  Patient Prep;mask, sterile gloves, chlorhexidine gluconate and isopropyl alcohol.  .  Needle: insulated, short bevel ([place]) Needle Gauge: 21.  Needle Length (millimeters) 100  Insertion Method: Single Shot.       Assessment/Narrative  Paresthesias: No.  Injection made incrementally with aspirations every 5 mL..  The placement was negative for: blood aspirated, painful injection and site bleeding.  Bolus given via needle..   Secured via.   Complications: none. Test dose of mL at. Test dose negative for signs of intravascular, subdural or intrathecal injection. Comments:  0.5% Bupivacaine with 1:400,000 epinephrine injected. Patient tolerated the procedure well.    The surgeon has given a verbal order transferring care of this patient to me for the performance of a regional analgesia block for post operative pain control. It is requested of me because I am uniquely trained and qualified to perform this block and the surgeon is neither trained nor qualified to perform this procedure.

## 2019-08-30 NOTE — ANESTHESIA CARE TRANSFER NOTE
Patient: Deangelo Leahy    Procedure(s):  OPEN REDUCTION INTERNAL FIXATION LEFT TIBIA PLATEAU    Diagnosis: LEFT TIBIAL PLATEAU FRACTURE  Diagnosis Additional Information: No value filed.    Anesthesia Type:   No value filed.     Note:  Airway :Nasal Cannula  Patient transferred to:PACU  Comments: Vital signs stable on 3L NC, no complaints of pain, report given to RN before transfer of patient care. Handoff Report: Identifed the Patient, Identified the Reponsible Provider, Reviewed the pertinent medical history, Discussed the surgical course, Reviewed Intra-OP anesthesia mangement and issues during anesthesia, Set expectations for post-procedure period and Allowed opportunity for questions and acknowledgement of understanding      Vitals: (Last set prior to Anesthesia Care Transfer)    CRNA VITALS  8/30/2019 1335 - 8/30/2019 1415      8/30/2019             Resp Rate (set):  10                Electronically Signed By: GIBRAN Bhardwaj CRNA  August 30, 2019  2:15 PM

## 2019-08-30 NOTE — OP NOTE
[facility]  Orthopedic Operative Note    Deangelo Leahy MRN# [id number]   YOB: 1985  Procedure Date:  8/30/2019  Age: 34 year old     PREOPERATIVE DIAGNOSIS:    1. Left bicondylar tibial plateau fracture    POSTOPERATIVE DIAGNOSIS:    1. Left bicondylar tibial plateau fracture  2. Lateral meniscal tear    PROCEDURE PERFORMED:    1. Open reduction internal fixation Left bicondylar tibial plateau fracture   2.  Left lateral meniscal repair    SURGEON:  Nathan Alberto MD    FIRST ASSISTANT:  Sujatha Ly PA-C. Her assistance was critical during transfer, positioning, preparation, draping, surgical approach, fracture reduction, implant placement, closure and placement of dressings. Her assistance allowed me to operate efficiently, decreasing surgical time and risk.     ANESTHESIA:  General    EBL: 200 mL    TOURNIQUET TIME: 120 minutes at 300 mmHg    IMPLANTS:   Implant Name Type Inv. Item Serial No.  Lot No. LRB No. Used   IMP SCR SYN CORTEX 3.5X36MM SELF TAP .836 Metallic Hardware/[place] IMP SCR SYN CORTEX 3.5X36MM SELF TAP .836  [employer] 08/27/2019 42 02 Left 1   IMP PLATE SYN LCP RECON 3.5MM 05H .051 Metallic Hardware/[place] IMP PLATE SYN LCP RECON 3.5MM 05H .051  [employer] 08/27/2019 42 02 Left 1   IMP SCR SYN 3.5X20MM LOCKING W/STARDRIVE .106 Metallic Hardware/[place] IMP SCR SYN 3.5X20MM LOCKING W/STARDRIVE .106  [employer] AUTOCLAVE [id number] 42 02 Left 1   IMP SCR SYN CORTEX 3.5X40MM SELF TAP .840 Metallic Hardware/[place] IMP SCR SYN CORTEX 3.5X40MM SELF TAP .840  [employer] AUTOCLAVE [id number] 42 02 Left 3   GRAFT BONE CHIPS CANC 15ML 164689 Bone/Tissue/Biologic GRAFT BONE CHIPS CANC 15ML [id number] MUSCULOSKELETAL ORTIZ  Left 1   8HOLE 147MM LEFT 3.5MM VA-LCP PROXIMAL TIBIA PLATE    [employer] 08/08/2019 42 06 Left 1   3.5 X 75MM LOCKING SCREW    [employer] 08/08/2019 42 06  Left 2   3.5 X 85MM LOCKING SCREW    [employer] 08/08/2019 42 06 Left 2   IMP SCR SYN CORTEX 3.5X32MM SELF TAP .832 Metallic Hardware/[place] IMP SCR SYN CORTEX 3.5X32MM SELF TAP .832  [employer] 08/27/2019 42 02 Left 3       COMPLICATIONS: None     DISPOSITION: PACU    CONDITION: Stable    INDICATIONS: Deangelo Leahy is a 34 year old male who presents for definitive open reduction internal fixation.  He has been indicated for surgery secondary to severe depression of the lateral tibial plateau and bicondylar involvement.  Risks, benefits and alternatives to proposed procedure discussed in detail. Risks of bleeding, infection, nerve damage, nonunion, malunion, hardware failure and possible need for removal of hardware discussed. Possibility of post traumatic and post surgical pain and stiffness discussed. Patient elects to proceed. Signed informed consent obtained and placed on chart.    PROCEDURE:  Patient was identified in preoperative holding area and operative site marked with indelible marker.  Brought into the operating room and placed supine on operating table.  After induction of general anesthesia, all bony prominences were well-padded and a bump was placed beneath the ipsilateral buttock and the operative extremity was placed on a bone foam ramp. A tourniquet was applied to the thigh. The extremity was prepped and draped in normal sterile fashion.  Antibiotic administration was confirmed and a time-out completed.  Attention first turned toward the posterior medial fragment.  Standard medial approach was made along the line over the posterior medial edge of the tibia proximally proceeding sharply through skin, subcutaneous tissue down to fascia.  Fascia was incised.  Has anserine tendons were identified and retracted anteriorly.  Gastrocnemius medial head was identified and retracted posteriorly.  Subperiosteal dissection carried down to the posterior medial corner of the tibia.   Subperiosteal dissection carried along the posterior aspect of the tibia to gain access to the apex of the posterior medial fracture fragment.  Fracture reduced and secured with a 5 hole 3.5 mm reconstruction plate with 3 nonlocking screws distally and a single unicortical locking screw proximally.  We then turned our attention toward the lateral side.  Standard anterolateral approach made proceeding sharply through skin subcutaneous tissue down to fascia.  Fascia incised in line with the skin incision.  Anterior compartment muscle freed from the medial aspect of the fascia and elevated with a Richardson and retracted laterally.  Subperiosteal dissection carried out over the proximal lateral plateau.  Sub-meniscal arthrotomy performed with a #10 scalpel blade.  There was a undersurface tear of the lateral meniscus approximately 3 mm medial to the capsular attachment of the meniscus.  We utilized #2 Ethibond sutures in vertical mattress fashion to repair this tear the meniscus along with a single 2-0 Vicryl suture.  A total of four #2 Ethibond sutures were placed.  This also allowed us to retract the meniscus for exposure.  We utilized an osteotome to create access to the lateral plateau.  We immediately encountered the severely depressed lateral plateau fragment.  We placed a femoral distractor to allow us to distract lateral aspect of the tibial plateau so that appropriate elevation of the lateral plateau could be achieved.  Lateral plateau articular fragments were elevated and held provisionally with K wires.  We then utilized 15 cc of cancellus croutons to fill the defect from elevation of the joint surface.  We then replaced the lateral cortical fragment we removed to gain access to the articular surface.  This was held with K wire.  We subsequently inserted a lateral plate and held provisionally with K wires.  We then secured it with a cortical screw through the oblique hole.  We secured with 4 locking screws through  the proximal row ensuring that the posterior screws engaged sharp posterior medial fracture fragment.  We placed 3 additional screws percutaneously through a total of 2 incisions distally to secure the plate to the shaft.  Multiple fluoroscopic views were obtained demonstrating appropriate hardware placement and appropriate fracture alignment.  Wounds were closed after thorough irrigation with 0 Vicryl for fascia, 2-0 Vicryl for subcutaneous tissues and 3-0 nylon in vertical mattress fashion for skin.  Dry sterile dressing applied with Xeroform, 4 x 4's, sterile cotton roll and Ace wrap.  Hinged knee brace applied in unlocked position of the patient awakened from general anesthesia and taken to recovery in stable condition.  There were no complications and patient tolerated procedure well.    PLAN:  Nonweightbearing left lower extremity for 10 weeks  Hinged knee brace in unlocked position to be worn at all times with the exception of rest and hygiene for total of 6 weeks  Pain control  DVTP with  enteric coated for 4 weeks  Antibiotic prophylaxis with Ancef x24 hours  Follow-up in 2 weeks for    Wound checks   Suture removal   New x-rays AP/Lateral/Mortise out of plaster nonweightbearing    Nathan Alberto MD  Orthopedic Trauma and Arthroplasty  [facility]  [phone]

## 2019-08-31 ENCOUNTER — APPOINTMENT (OUTPATIENT)
Dept: PHYSICAL THERAPY | Facility: CLINIC | Age: 34
End: 2019-08-31
Attending: PHYSICIAN ASSISTANT
Payer: COMMERCIAL

## 2019-08-31 VITALS
OXYGEN SATURATION: 98 % | HEART RATE: 103 BPM | SYSTOLIC BLOOD PRESSURE: 139 MMHG | TEMPERATURE: 98.3 F | HEIGHT: 70 IN | DIASTOLIC BLOOD PRESSURE: 78 MMHG | RESPIRATION RATE: 16 BRPM | WEIGHT: 207.5 LBS | BODY MASS INDEX: 29.71 KG/M2

## 2019-08-31 LAB
GLUCOSE SERPL-MCNC: 119 MG/DL (ref 70–99)
HGB BLD-MCNC: 12.4 G/DL (ref 13.3–17.7)

## 2019-08-31 PROCEDURE — 97161 PT EVAL LOW COMPLEX 20 MIN: CPT | Mod: GP

## 2019-08-31 PROCEDURE — 25000132 ZZH RX MED GY IP 250 OP 250 PS 637: Performed by: PHYSICIAN ASSISTANT

## 2019-08-31 PROCEDURE — 85018 HEMOGLOBIN: CPT | Performed by: PHYSICIAN ASSISTANT

## 2019-08-31 PROCEDURE — 36415 COLL VENOUS BLD VENIPUNCTURE: CPT | Performed by: PHYSICIAN ASSISTANT

## 2019-08-31 PROCEDURE — 82947 ASSAY GLUCOSE BLOOD QUANT: CPT | Performed by: PHYSICIAN ASSISTANT

## 2019-08-31 PROCEDURE — 40000894 ZZH STATISTIC OT IP EVAL DEFER

## 2019-08-31 PROCEDURE — 25000128 H RX IP 250 OP 636: Performed by: PHYSICIAN ASSISTANT

## 2019-08-31 PROCEDURE — G0378 HOSPITAL OBSERVATION PER HR: HCPCS

## 2019-08-31 RX ORDER — AMOXICILLIN 250 MG
1 CAPSULE ORAL 2 TIMES DAILY PRN
Qty: 60 TABLET | Refills: 0 | Status: SHIPPED | OUTPATIENT
Start: 2019-08-31

## 2019-08-31 RX ORDER — OXYCODONE HYDROCHLORIDE 5 MG/1
5-10 TABLET ORAL
Qty: 40 TABLET | Refills: 0 | Status: SHIPPED | OUTPATIENT
Start: 2019-08-31

## 2019-08-31 RX ORDER — ASPIRIN 325 MG
325 TABLET, DELAYED RELEASE (ENTERIC COATED) ORAL DAILY
Qty: 50 TABLET | Refills: 0 | Status: SHIPPED | OUTPATIENT
Start: 2019-09-01

## 2019-08-31 RX ORDER — HYDROXYZINE HYDROCHLORIDE 25 MG/1
25 TABLET, FILM COATED ORAL EVERY 6 HOURS PRN
Qty: 30 TABLET | Refills: 0 | Status: SHIPPED | OUTPATIENT
Start: 2019-08-31

## 2019-08-31 RX ADMIN — OXYCODONE HYDROCHLORIDE 10 MG: 5 TABLET ORAL at 07:54

## 2019-08-31 RX ADMIN — METHOCARBAMOL TABLETS 750 MG: 750 TABLET, COATED ORAL at 05:57

## 2019-08-31 RX ADMIN — ACETAMINOPHEN 975 MG: 325 TABLET ORAL at 08:04

## 2019-08-31 RX ADMIN — OXYCODONE HYDROCHLORIDE 10 MG: 5 TABLET ORAL at 10:52

## 2019-08-31 RX ADMIN — OXYCODONE HYDROCHLORIDE 10 MG: 5 TABLET ORAL at 04:42

## 2019-08-31 RX ADMIN — METHOCARBAMOL TABLETS 750 MG: 750 TABLET, COATED ORAL at 12:13

## 2019-08-31 RX ADMIN — MULTIPLE VITAMINS W/ MINERALS TAB 1 TABLET: TAB at 08:04

## 2019-08-31 RX ADMIN — SENNOSIDES AND DOCUSATE SODIUM 2 TABLET: 8.6; 5 TABLET ORAL at 08:04

## 2019-08-31 RX ADMIN — CEFAZOLIN SODIUM 2 G: 2 INJECTION, SOLUTION INTRAVENOUS at 05:54

## 2019-08-31 RX ADMIN — OXYCODONE HYDROCHLORIDE 10 MG: 5 TABLET ORAL at 01:29

## 2019-08-31 RX ADMIN — ASPIRIN 325 MG: 325 TABLET, DELAYED RELEASE ORAL at 08:03

## 2019-08-31 NOTE — PROGRESS NOTES
.Patient vital signs are at baseline:YES  Patient able to ambulate as they were prior to admission or with assist devices provided by therapies during their stay:  NO pt dangled only  Patient MUST void prior to discharge:  YES  Patient able to tolerate oral intake:  YES  Pain has adequate pain control using Oral analgesics:YES

## 2019-08-31 NOTE — PROGRESS NOTES
Pt arrived on floor @ 1615 from PACU. A&O , anxious @ times. Numbness and tingling present on BLE. Pain managed w/ oxycodone, IV dialudid x1 and robaxin. Hinge brace in place. Dressing CDI. Denies chest pain or SOB. Bladder scanned for 620mL @ 1845, denies discomfort, numb d/t spinal per pt. Requested not to Straight cath for few hours. Good appetite. Spouse @ bedside. Will continue to monitor.

## 2019-08-31 NOTE — PLAN OF CARE
Discharge Planner OT   Patient plan for discharge: Home with A from wife    Current status: Order received, chart reviewed. Pt admitted under observation following L ORIF of the tibia fracture. Pt requires SBA with crutches for functional transfers. While working with PT, pt expressed no OT concerns. Pt has necessary AE/DME at home already.     Barriers to return to prior living situation: Stairs; however, addressed by PT this morning    Recommendations for discharge: Home    Rationale for recommendations: Pt limited by pain and NWB of LLE; however, doing well and reports adequate A at home from wife. Pt demonstrates no skilled OT needs; thus, discontinuation of order at this time.        Entered by: Stevo Laboy 08/31/2019 9:42 AM

## 2019-08-31 NOTE — PROGRESS NOTES
.Patient vital signs are at baseline: YES  Patient able to ambulate as they were prior to admission or with assist devices provided by therapies during their stay:  No; pt dangled at bedside.   Patient MUST void prior to discharge:  YES  Patient able to tolerate oral intake:  YES  Pain has adequate pain control using Oral analgesics:  YES

## 2019-08-31 NOTE — PROVIDER NOTIFICATION
Talked w/ Johanna Ziegler ortho PA regarding dressing change and discharge orders. Received TRBO for discharge order and no dressing change, continue w/ acewrap along w/ hinge knee brace on.

## 2019-08-31 NOTE — PLAN OF CARE
[id number] Pt A/OX4. VSS ex slightly elevated bp due to pain. Pain rating 9/10 managed with IV Dilaudid and Oxycodone. CMS intact ex numbness in BLE. Hinge brace in place. X-ray completed. Voided 900 mL @ 2000. Regular diet. Nursing will continue to monitor.

## 2019-08-31 NOTE — PROGRESS NOTES
.Patient vital signs are at baseline:YES  Patient able to ambulate as they were prior to admission or with assist devices provided by therapies during their stay: NO ONLY DANGLED   Patient MUST void prior to discharge:  YES  Patient able to tolerate oral intake: YES   Pain has adequate pain control using Oral analgesics:  YES

## 2019-08-31 NOTE — PROGRESS NOTES
Pt is A&O x4, anxious. Up w/ SBA w/ crutches, nonWB on LLE. Dressing CDI, clarified w/ Johanna, ortho PA (see notes). Voiding adequately per urinal. Slight numbness per pt on BLE, sensation intact per assessment. IV discontinued. Reviewed discharge paperwork w/ pt and spouse. Discharge pt and spouse w/ medication and belonging. No further questions asked.

## 2019-08-31 NOTE — PROGRESS NOTES
08/31/19 0800   Quick Adds   Type of Visit Initial PT Evaluation   Living Environment   Lives With child(crista), adult;child(crista), dependent   Living Arrangements house   Home Accessibility stairs to enter home;stairs within home   Number of Stairs, Main Entrance 6   Stair Railings, Main Entrance railings safe and in good condition;railings on both sides of stairs   Number of Stairs, Within Home, Primary 10   Stair Railings, Within Home, Primary none   Transportation Anticipated family or friend will provide   Self-Care   Usual Activity Tolerance good   Current Activity Tolerance moderate   Regular Exercise No   Equipment Currently Used at Home crutches   Functional Level Prior   Ambulation 1-->assistive equipment   Transferring 1-->assistive equipment   Fall history within last six months yes   Number of times patient has fallen within last six months 1   Which of the above functional risks had a recent onset or change? ambulation;transferring   Prior Functional Level Comment Pt reports independence with mobility prior to admit. Had original injury two weeks ago, has received helped for past two weeks from wife, was totally independent prior to that.   General Information   Onset of Illness/Injury or Date of Surgery - Date 08/30/19   Referring Physician Sujatha QUINTERO   Patient/Family Goals Statement To go home   Pertinent History of Current Problem (include personal factors and/or comorbidities that impact the POC) Pt is a 34 year old male s/p left tibal plateua fracture, POD #1.   Weight-Bearing Status - LLE nonweight-bearing   Cognitive Status Examination   Orientation orientation to person, place and time   Level of Consciousness alert   Follows Commands and Answers Questions 100% of the time   Pain Assessment   Patient Currently in Pain Yes, see Vital Sign flowsheet   Range of Motion (ROM)   ROM Comment Right LE WFL, Left LE NT 2/2 knee immobilizer   Strength   Strength Comments Right LE WFL, Left LE NT 2/2  "knee immobilizer   Bed Mobility   Bed Mobility Comments Supine to/from sit independent   Transfer Skills   Transfer Comments Sit to/from stand modified independent   Gait   Gait Comments 75' x 2 axillary crutches SBA. Up/down 6 steps handrails and SBA.   Balance   Balance Comments Good in sitting and standing   Clinical Impression   Criteria for Skilled Therapeutic Intervention evaluation only   Clinical Presentation Stable/Uncomplicated   Clinical Presentation Rationale VSS, pain controlled   Clinical Decision Making (Complexity) Low complexity   Anticipated Equipment Needs at Discharge crutches   Anticipated Discharge Disposition Home   Risk & Benefits of therapy have been explained Yes   Patient, Family & other staff in agreement with plan of care Yes   [facility] TM \"6 Clicks\"   2016, Trustees of [facility], under license to [employer].  All rights reserved.   6 Clicks Short Forms Basic Mobility Inpatient Short Form   [facility]  \"6 Clicks\" V.2 Basic Mobility Inpatient Short Form   1. Turning from your back to your side while in a flat bed without using bedrails? 4 - None   2. Moving from lying on your back to sitting on the side of a flat bed without using bedrails? 4 - None   3. Moving to and from a bed to a chair (including a wheelchair)? 4 - None   4. Standing up from a chair using your arms (e.g., wheelchair, or bedside chair)? 4 - None   5. To walk in hospital room? 3 - A Little   6. Climbing 3-5 steps with a railing? 3 - A Little   Basic Mobility Raw Score (Score out of 24.Lower scores equate to lower levels of function) 22   Total Evaluation Time   Total Evaluation Time (Minutes) 12     "

## 2019-08-31 NOTE — PROGRESS NOTES
Ortho  S/p ORIF left bicondylar tibial plateau fracture, left lateral meniscus repair, POD#1    Denies CP, SOB, nausea, fevers  Pain improving  NAD, alert and oriented  Abd soft  Left LE - dry dressing, HKB  Calves soft, NT  CMS intact  Hgb - 12.4    Plan:  NWB LLE x 10 weeks  HKB unlocked x 6 weeks  DVTP - ASA 325mg daily x 4 weeks  PT/OT  Discharge when medically stable and passes PT/OT  RTC 2 weeks for wound check, xrays, and suture removal    Johanna Ziegler PA-C  8:09 AM

## 2019-08-31 NOTE — PLAN OF CARE
Discharge Planner PT   Patient plan for discharge: Home with wife  Current status: Pt is a 34 year old male POD#1 left ORIF tibia, orders for NWBing Left LE and knee brace in unlocked position unless resting. At baseline, pt lives with his wife and two small children. 6 steps to enter home and 10 steps to bedroom, able to stay on main level as needed. Pt has been on crutches last two weeks and has been functioning well at home, wife able to assist as needed, including bathing and dressing.  This date, pt is independent with bed mobility and transfers. Pt ambulates 75' x 2 with axillary crutches and SBA. Pt up/down 6 steps with bilateral hand rails with SBA.  Barriers to return to prior living situation: Steps, NWBing  Recommendations for discharge: Home  Rationale for recommendations: Pt has been functioning well during past two weeks on crutches with intermittent assist from wife. Pt demonstrates ability to perform bed mobility and transfers independently. Pt requires SBA for ambulation and stair management, which wife is able to provide. Pt reports pain is under control with mobility.       Entered by: Betty Lujan 08/31/2019 9:21 AM        Will plan to discharge patient from PT and complete PT order as patient is under OBS status and discharge recommendations are listed above.

## 2019-08-31 NOTE — DISCHARGE INSTRUCTIONS
Non weight bear left lower leg for 10 weeks  Hinged knee brace unlocked in position for 6 weeks  Aspirin 325mg daily for 4 weeks  Return to clinic in two weeks for xrays, wound check and suture removal.  Please call [phone] to schedule

## 2019-09-18 NOTE — DISCHARGE SUMMARY
[facility]    Discharge Summary  Orthopedics    Date of Admission:  8/30/2019  Date of Discharge:  8/31/2019  1:37 PM  Discharging Provider: Sujatha Ly PA-C    Discharge Diagnoses   Patient Active Problem List   Diagnosis     Tibial plateau fracture       Procedure/Surgery Information   Procedure: Procedure(s):  OPEN REDUCTION INTERNAL FIXATION LEFT TIBIA PLATEAU   Surgeon(s): Surgeon(s) and Role:     * Nathan Alberto MD - Primary     * Sujatha Ly PA-C - Assisting   Specimens: * No specimens in log *   Non-operative procedures None performed     History of Present Illness   Deangelo Leahy is a 34 year old male who presents for definitive open reduction internal fixation.  He has been indicated for surgery secondary to severe depression of the lateral tibial plateau and bicondylar involvement.  Risks, benefits and alternatives to proposed procedure discussed in detail. Risks of bleeding, infection, nerve damage, nonunion, malunion, hardware failure and possible need for removal of hardware discussed. Possibility of post traumatic and post surgical pain and stiffness discussed. Patient elects to proceed. Signed informed consent obtained and placed on chart. (Dr. Alberto, operative report)    Hospital Course   Deangelo Leahy was admitted on 8/30/2019 for the above stated procedures. He was admitted to orthopedics under observation, and followed by PT and OT for immediate mobilization. He was started on mechanical and chemical DVT prophylaxis, and 24 hours of IV antibiotics were administered per standard of care. The patient progressed well and was discharged to home on POD#1.  The following problems were addressed during his hospitalization:  Active Problems:    Tibial plateau fracture      Post-operative pain control: included Hydromorphone (dilaudid) IV, oxycodone, and tylenoland will be Oxycodone and tylenol on discharge.     Medications discontinued or adjusted  during this hospitalization: No change     Antibiotics prescribed at discharge: None prescribed     Imaging study follow up needs:   -No studies require specific follow-up    Significant Findings: Please see full operative report    Sujatha Ly PA-C    Discharge Disposition   Discharged to home   Condition at discharge: Stable    Pending Results   Final pathology results: No pathology submitted    Unresulted Labs Ordered in the Past 30 Days of this Admission     No orders found from 7/31/2019 to 8/31/2019.          Primary Care Physician   Lemuel Mcdonald    0    Consultations This Hospital Stay   OCCUPATIONAL THERAPY ADULT IP CONSULT  PHYSICAL THERAPY ADULT IP CONSULT    Time Spent on this Encounter   I have spent less than 30 minutes on this discharge.    Discharge Orders   No discharge procedures on file.  Discharge Medications   Discharge Medication List as of 8/31/2019  1:08 PM      START taking these medications    Details   aspirin (ASA) 325 MG EC tablet Take 1 tablet (325 mg) by mouth daily, Disp-50 tablet, R-0, E-Prescribe      hydrOXYzine (ATARAX) 25 MG tablet Take 1 tablet (25 mg) by mouth every 6 hours as needed for itching, Disp-30 tablet, R-0, E-Prescribe      oxyCODONE (ROXICODONE) 5 MG tablet Take 1-2 tablets (5-10 mg) by mouth every 3 hours as needed for moderate to severe pain, Disp-40 tablet, R-0, Local Print      senna-docusate (SENOKOT-S/PERICOLACE) 8.6-50 MG tablet Take 1 tablet by mouth 2 times daily as needed for constipation, Disp-60 tablet, R-0, E-Prescribe         CONTINUE these medications which have NOT CHANGED    Details   acetaminophen (TYLENOL) 500 MG tablet Take 500-1,000 mg by mouth every 6 hours as needed for mild pain, Historical      magnesium 100 MG TABS Take 1 tablet by mouth daily , Historical      multivitamin w/minerals (MULTI-VITAMIN) tablet Take 1 tablet by mouth daily, Historical         STOP taking these medications       ibuprofen (ADVIL/MOTRIN) 200 MG tablet  Comments:   Reason for Stopping:             Allergies   No Known Allergies  Data   Most Recent 3 CBC's:  Recent Labs   Lab Test 08/31/19  0618   HGB 12.4*      Most Recent 3 BMP's:  Recent Labs   Lab Test 08/31/19  0618   *     Most Recent 2 LFT's:No lab results found.  Most Recent INR's and Anticoagulation Dosing History:  Anticoagulation Dose History     There is no flowsheet data to display.        Most Recent 3 Troponin's:No lab results found.  Most Recent Cholesterol Panel:No lab results found.  Most Recent 6 Bacteria Isolates From Any Culture (See EPIC Reports for Culture Details):No lab results found.  Most Recent TSH, T4 and A1c Labs:No lab results found.  Results for orders placed or performed during the hospital encounter of 08/30/19   XR Knee Port Right 1/2 Views    Narrative    RIGHT KNEE PORTABLE ONE TO TWO VIEWS 8/30/2019 10:04 AM     HISTORY:  Pain.    COMPARISON: None.      Impression    IMPRESSION: No acute bony abnormality. Mild medial joint space  narrowing is suggested. No joint space effusion. Anterior subcutaneous  edema.    TONY LIM MD   XR Surgery TIMOTHY Fluoro L/T 5 Min w Stills    Narrative    SURGERY C-ARM FLUOROSCOPY LESS THAN FIVE MINUTES WITH STILLS   8/30/2019 1:30 PM     HISTORY: ORIF left tibia.    COMPARISON: None.      Impression    IMPRESSION: A total of seven spot fluoroscopic images obtained of  surgical plate and screws in the proximal tibia. Total fluoroscopic  time is 2.6 minutes.    NAEEM HAY MD   XR Knee Left 1/2 Views    Narrative    KNEE LEFT ONE TO TWO VIEWS    8/30/2019 8:53 PM     HISTORY: Status post ORIF left tibial plateau.    COMPARISON: Intraoperative films from 11:57 AM today.      Impression    IMPRESSION: Evidence of ORIF of lateral tibial plateau and proximal  tibia fracture with two sets of plate and screws. No evidence of  complication.    CRYSTAL PEREZ MD

## 2019-09-27 ENCOUNTER — OFFICE VISIT - HEALTHEAST (OUTPATIENT)
Dept: PHYSICAL THERAPY | Facility: REHABILITATION | Age: 34
End: 2019-09-27

## 2019-09-27 ENCOUNTER — AMBULATORY - HEALTHEAST (OUTPATIENT)
Dept: ADMINISTRATIVE | Facility: REHABILITATION | Age: 34
End: 2019-09-27

## 2019-09-27 DIAGNOSIS — Z87.81 S/P ORIF (OPEN REDUCTION INTERNAL FIXATION) FRACTURE: ICD-10-CM

## 2019-09-27 DIAGNOSIS — S82.142A TIBIAL PLATEAU FRACTURE, LEFT: ICD-10-CM

## 2019-09-27 DIAGNOSIS — Z98.890 S/P ORIF (OPEN REDUCTION INTERNAL FIXATION) FRACTURE: ICD-10-CM

## 2019-10-01 ENCOUNTER — OFFICE VISIT - HEALTHEAST (OUTPATIENT)
Dept: PHYSICAL THERAPY | Facility: REHABILITATION | Age: 34
End: 2019-10-01

## 2019-10-01 DIAGNOSIS — S82.142A TIBIAL PLATEAU FRACTURE, LEFT: ICD-10-CM

## 2019-10-01 DIAGNOSIS — Z98.890 S/P ORIF (OPEN REDUCTION INTERNAL FIXATION) FRACTURE: ICD-10-CM

## 2019-10-01 DIAGNOSIS — Z87.81 S/P ORIF (OPEN REDUCTION INTERNAL FIXATION) FRACTURE: ICD-10-CM

## 2019-10-04 ENCOUNTER — OFFICE VISIT - HEALTHEAST (OUTPATIENT)
Dept: PHYSICAL THERAPY | Facility: REHABILITATION | Age: 34
End: 2019-10-04

## 2019-10-04 DIAGNOSIS — Z98.890 S/P ORIF (OPEN REDUCTION INTERNAL FIXATION) FRACTURE: ICD-10-CM

## 2019-10-04 DIAGNOSIS — Z87.81 S/P ORIF (OPEN REDUCTION INTERNAL FIXATION) FRACTURE: ICD-10-CM

## 2019-10-04 DIAGNOSIS — S82.142A TIBIAL PLATEAU FRACTURE, LEFT: ICD-10-CM

## 2019-10-08 ENCOUNTER — OFFICE VISIT - HEALTHEAST (OUTPATIENT)
Dept: PHYSICAL THERAPY | Facility: REHABILITATION | Age: 34
End: 2019-10-08

## 2019-10-08 DIAGNOSIS — S82.142A TIBIAL PLATEAU FRACTURE, LEFT: ICD-10-CM

## 2019-10-08 DIAGNOSIS — Z98.890 S/P ORIF (OPEN REDUCTION INTERNAL FIXATION) FRACTURE: ICD-10-CM

## 2019-10-08 DIAGNOSIS — Z87.81 S/P ORIF (OPEN REDUCTION INTERNAL FIXATION) FRACTURE: ICD-10-CM

## 2019-10-11 ENCOUNTER — OFFICE VISIT - HEALTHEAST (OUTPATIENT)
Dept: PHYSICAL THERAPY | Facility: REHABILITATION | Age: 34
End: 2019-10-11

## 2019-10-11 DIAGNOSIS — S82.142A TIBIAL PLATEAU FRACTURE, LEFT: ICD-10-CM

## 2019-10-11 DIAGNOSIS — Z98.890 S/P ORIF (OPEN REDUCTION INTERNAL FIXATION) FRACTURE: ICD-10-CM

## 2019-10-11 DIAGNOSIS — Z87.81 S/P ORIF (OPEN REDUCTION INTERNAL FIXATION) FRACTURE: ICD-10-CM

## 2019-10-15 ENCOUNTER — OFFICE VISIT - HEALTHEAST (OUTPATIENT)
Dept: PHYSICAL THERAPY | Facility: REHABILITATION | Age: 34
End: 2019-10-15

## 2019-10-15 DIAGNOSIS — Z98.890 S/P ORIF (OPEN REDUCTION INTERNAL FIXATION) FRACTURE: ICD-10-CM

## 2019-10-15 DIAGNOSIS — S82.142A TIBIAL PLATEAU FRACTURE, LEFT: ICD-10-CM

## 2019-10-15 DIAGNOSIS — Z87.81 S/P ORIF (OPEN REDUCTION INTERNAL FIXATION) FRACTURE: ICD-10-CM

## 2019-10-18 ENCOUNTER — OFFICE VISIT - HEALTHEAST (OUTPATIENT)
Dept: PHYSICAL THERAPY | Facility: REHABILITATION | Age: 34
End: 2019-10-18

## 2019-10-18 DIAGNOSIS — Z87.81 S/P ORIF (OPEN REDUCTION INTERNAL FIXATION) FRACTURE: ICD-10-CM

## 2019-10-18 DIAGNOSIS — S82.142A TIBIAL PLATEAU FRACTURE, LEFT: ICD-10-CM

## 2019-10-18 DIAGNOSIS — Z98.890 S/P ORIF (OPEN REDUCTION INTERNAL FIXATION) FRACTURE: ICD-10-CM

## 2019-10-22 ENCOUNTER — OFFICE VISIT - HEALTHEAST (OUTPATIENT)
Dept: PHYSICAL THERAPY | Facility: REHABILITATION | Age: 34
End: 2019-10-22

## 2019-10-22 DIAGNOSIS — Z98.890 S/P ORIF (OPEN REDUCTION INTERNAL FIXATION) FRACTURE: ICD-10-CM

## 2019-10-22 DIAGNOSIS — Z87.81 S/P ORIF (OPEN REDUCTION INTERNAL FIXATION) FRACTURE: ICD-10-CM

## 2019-10-22 DIAGNOSIS — S82.142A TIBIAL PLATEAU FRACTURE, LEFT: ICD-10-CM

## 2019-10-25 ENCOUNTER — OFFICE VISIT - HEALTHEAST (OUTPATIENT)
Dept: PHYSICAL THERAPY | Facility: REHABILITATION | Age: 34
End: 2019-10-25

## 2019-10-25 DIAGNOSIS — S82.142A TIBIAL PLATEAU FRACTURE, LEFT: ICD-10-CM

## 2019-10-25 DIAGNOSIS — Z98.890 S/P ORIF (OPEN REDUCTION INTERNAL FIXATION) FRACTURE: ICD-10-CM

## 2019-10-25 DIAGNOSIS — Z87.81 S/P ORIF (OPEN REDUCTION INTERNAL FIXATION) FRACTURE: ICD-10-CM

## 2019-10-29 ENCOUNTER — OFFICE VISIT - HEALTHEAST (OUTPATIENT)
Dept: PHYSICAL THERAPY | Facility: REHABILITATION | Age: 34
End: 2019-10-29

## 2019-10-29 DIAGNOSIS — Z98.890 S/P ORIF (OPEN REDUCTION INTERNAL FIXATION) FRACTURE: ICD-10-CM

## 2019-10-29 DIAGNOSIS — S82.142A TIBIAL PLATEAU FRACTURE, LEFT: ICD-10-CM

## 2019-10-29 DIAGNOSIS — Z87.81 S/P ORIF (OPEN REDUCTION INTERNAL FIXATION) FRACTURE: ICD-10-CM

## 2019-11-01 ENCOUNTER — OFFICE VISIT - HEALTHEAST (OUTPATIENT)
Dept: PHYSICAL THERAPY | Facility: REHABILITATION | Age: 34
End: 2019-11-01

## 2019-11-01 DIAGNOSIS — Z98.890 S/P ORIF (OPEN REDUCTION INTERNAL FIXATION) FRACTURE: ICD-10-CM

## 2019-11-01 DIAGNOSIS — Z87.81 S/P ORIF (OPEN REDUCTION INTERNAL FIXATION) FRACTURE: ICD-10-CM

## 2019-11-01 DIAGNOSIS — S82.142A TIBIAL PLATEAU FRACTURE, LEFT: ICD-10-CM

## 2019-11-05 ENCOUNTER — AMBULATORY - HEALTHEAST (OUTPATIENT)
Dept: ADMINISTRATIVE | Facility: REHABILITATION | Age: 34
End: 2019-11-05

## 2019-11-05 ENCOUNTER — OFFICE VISIT - HEALTHEAST (OUTPATIENT)
Dept: PHYSICAL THERAPY | Facility: REHABILITATION | Age: 34
End: 2019-11-05

## 2019-11-05 DIAGNOSIS — Z87.81 S/P ORIF (OPEN REDUCTION INTERNAL FIXATION) FRACTURE: ICD-10-CM

## 2019-11-05 DIAGNOSIS — Z98.890 S/P ORIF (OPEN REDUCTION INTERNAL FIXATION) FRACTURE: ICD-10-CM

## 2019-11-05 DIAGNOSIS — S82.142A TIBIAL PLATEAU FRACTURE, LEFT: ICD-10-CM

## 2019-11-08 ENCOUNTER — OFFICE VISIT - HEALTHEAST (OUTPATIENT)
Dept: PHYSICAL THERAPY | Facility: REHABILITATION | Age: 34
End: 2019-11-08

## 2019-11-08 DIAGNOSIS — Z87.81 S/P ORIF (OPEN REDUCTION INTERNAL FIXATION) FRACTURE: ICD-10-CM

## 2019-11-08 DIAGNOSIS — Z98.890 S/P ORIF (OPEN REDUCTION INTERNAL FIXATION) FRACTURE: ICD-10-CM

## 2019-11-08 DIAGNOSIS — S82.142A TIBIAL PLATEAU FRACTURE, LEFT: ICD-10-CM

## 2019-11-12 ENCOUNTER — OFFICE VISIT - HEALTHEAST (OUTPATIENT)
Dept: PHYSICAL THERAPY | Facility: REHABILITATION | Age: 34
End: 2019-11-12

## 2019-11-12 DIAGNOSIS — Z87.81 S/P ORIF (OPEN REDUCTION INTERNAL FIXATION) FRACTURE: ICD-10-CM

## 2019-11-12 DIAGNOSIS — S82.142A TIBIAL PLATEAU FRACTURE, LEFT: ICD-10-CM

## 2019-11-12 DIAGNOSIS — Z98.890 S/P ORIF (OPEN REDUCTION INTERNAL FIXATION) FRACTURE: ICD-10-CM

## 2019-11-15 ENCOUNTER — OFFICE VISIT - HEALTHEAST (OUTPATIENT)
Dept: PHYSICAL THERAPY | Facility: REHABILITATION | Age: 34
End: 2019-11-15

## 2019-11-15 DIAGNOSIS — S82.142A TIBIAL PLATEAU FRACTURE, LEFT: ICD-10-CM

## 2019-11-15 DIAGNOSIS — Z98.890 S/P ORIF (OPEN REDUCTION INTERNAL FIXATION) FRACTURE: ICD-10-CM

## 2019-11-15 DIAGNOSIS — Z87.81 S/P ORIF (OPEN REDUCTION INTERNAL FIXATION) FRACTURE: ICD-10-CM

## 2019-11-19 ENCOUNTER — OFFICE VISIT - HEALTHEAST (OUTPATIENT)
Dept: PHYSICAL THERAPY | Facility: REHABILITATION | Age: 34
End: 2019-11-19

## 2019-11-19 DIAGNOSIS — Z98.890 S/P ORIF (OPEN REDUCTION INTERNAL FIXATION) FRACTURE: ICD-10-CM

## 2019-11-19 DIAGNOSIS — S82.142A TIBIAL PLATEAU FRACTURE, LEFT: ICD-10-CM

## 2019-11-19 DIAGNOSIS — Z87.81 S/P ORIF (OPEN REDUCTION INTERNAL FIXATION) FRACTURE: ICD-10-CM

## 2019-11-22 ENCOUNTER — OFFICE VISIT - HEALTHEAST (OUTPATIENT)
Dept: PHYSICAL THERAPY | Facility: REHABILITATION | Age: 34
End: 2019-11-22

## 2019-11-22 DIAGNOSIS — S82.142A TIBIAL PLATEAU FRACTURE, LEFT: ICD-10-CM

## 2019-11-22 DIAGNOSIS — Z98.890 S/P ORIF (OPEN REDUCTION INTERNAL FIXATION) FRACTURE: ICD-10-CM

## 2019-11-22 DIAGNOSIS — Z87.81 S/P ORIF (OPEN REDUCTION INTERNAL FIXATION) FRACTURE: ICD-10-CM

## 2019-11-26 ENCOUNTER — OFFICE VISIT - HEALTHEAST (OUTPATIENT)
Dept: PHYSICAL THERAPY | Facility: REHABILITATION | Age: 34
End: 2019-11-26

## 2019-11-26 DIAGNOSIS — Z87.81 S/P ORIF (OPEN REDUCTION INTERNAL FIXATION) FRACTURE: ICD-10-CM

## 2019-11-26 DIAGNOSIS — S82.142A TIBIAL PLATEAU FRACTURE, LEFT: ICD-10-CM

## 2019-11-26 DIAGNOSIS — Z98.890 S/P ORIF (OPEN REDUCTION INTERNAL FIXATION) FRACTURE: ICD-10-CM

## 2019-11-29 ENCOUNTER — OFFICE VISIT - HEALTHEAST (OUTPATIENT)
Dept: PHYSICAL THERAPY | Facility: REHABILITATION | Age: 34
End: 2019-11-29

## 2019-11-29 DIAGNOSIS — Z87.81 S/P ORIF (OPEN REDUCTION INTERNAL FIXATION) FRACTURE: ICD-10-CM

## 2019-11-29 DIAGNOSIS — S82.142A TIBIAL PLATEAU FRACTURE, LEFT: ICD-10-CM

## 2019-11-29 DIAGNOSIS — Z98.890 S/P ORIF (OPEN REDUCTION INTERNAL FIXATION) FRACTURE: ICD-10-CM

## 2019-12-03 ENCOUNTER — OFFICE VISIT - HEALTHEAST (OUTPATIENT)
Dept: PHYSICAL THERAPY | Facility: REHABILITATION | Age: 34
End: 2019-12-03

## 2019-12-03 DIAGNOSIS — S82.142A TIBIAL PLATEAU FRACTURE, LEFT: ICD-10-CM

## 2019-12-03 DIAGNOSIS — Z98.890 S/P ORIF (OPEN REDUCTION INTERNAL FIXATION) FRACTURE: ICD-10-CM

## 2019-12-03 DIAGNOSIS — Z87.81 S/P ORIF (OPEN REDUCTION INTERNAL FIXATION) FRACTURE: ICD-10-CM

## 2019-12-17 ENCOUNTER — OFFICE VISIT - HEALTHEAST (OUTPATIENT)
Dept: PHYSICAL THERAPY | Facility: REHABILITATION | Age: 34
End: 2019-12-17

## 2019-12-17 DIAGNOSIS — S82.142A TIBIAL PLATEAU FRACTURE, LEFT: ICD-10-CM

## 2019-12-17 DIAGNOSIS — Z98.890 S/P ORIF (OPEN REDUCTION INTERNAL FIXATION) FRACTURE: ICD-10-CM

## 2019-12-17 DIAGNOSIS — Z87.81 S/P ORIF (OPEN REDUCTION INTERNAL FIXATION) FRACTURE: ICD-10-CM

## 2019-12-31 ENCOUNTER — OFFICE VISIT - HEALTHEAST (OUTPATIENT)
Dept: PHYSICAL THERAPY | Facility: REHABILITATION | Age: 34
End: 2019-12-31

## 2019-12-31 DIAGNOSIS — Z87.81 S/P ORIF (OPEN REDUCTION INTERNAL FIXATION) FRACTURE: ICD-10-CM

## 2019-12-31 DIAGNOSIS — S82.142A TIBIAL PLATEAU FRACTURE, LEFT: ICD-10-CM

## 2019-12-31 DIAGNOSIS — Z98.890 S/P ORIF (OPEN REDUCTION INTERNAL FIXATION) FRACTURE: ICD-10-CM

## 2020-01-14 ENCOUNTER — OFFICE VISIT - HEALTHEAST (OUTPATIENT)
Dept: PHYSICAL THERAPY | Facility: REHABILITATION | Age: 35
End: 2020-01-14

## 2020-01-14 DIAGNOSIS — Z98.890 S/P ORIF (OPEN REDUCTION INTERNAL FIXATION) FRACTURE: ICD-10-CM

## 2020-01-14 DIAGNOSIS — Z87.81 S/P ORIF (OPEN REDUCTION INTERNAL FIXATION) FRACTURE: ICD-10-CM

## 2020-01-14 DIAGNOSIS — S82.142A TIBIAL PLATEAU FRACTURE, LEFT: ICD-10-CM

## 2020-01-28 ENCOUNTER — OFFICE VISIT - HEALTHEAST (OUTPATIENT)
Dept: PHYSICAL THERAPY | Facility: REHABILITATION | Age: 35
End: 2020-01-28

## 2020-01-28 DIAGNOSIS — S82.142A TIBIAL PLATEAU FRACTURE, LEFT: ICD-10-CM

## 2020-01-28 DIAGNOSIS — Z98.890 S/P ORIF (OPEN REDUCTION INTERNAL FIXATION) FRACTURE: ICD-10-CM

## 2020-01-28 DIAGNOSIS — Z87.81 S/P ORIF (OPEN REDUCTION INTERNAL FIXATION) FRACTURE: ICD-10-CM

## 2020-02-03 ENCOUNTER — RECORDS - HEALTHEAST (OUTPATIENT)
Dept: LAB | Facility: CLINIC | Age: 35
End: 2020-02-03

## 2020-02-03 LAB
ALBUMIN SERPL-MCNC: 4.1 G/DL (ref 3.5–5)
ALP SERPL-CCNC: 70 U/L (ref 45–120)
ALT SERPL W P-5'-P-CCNC: 101 U/L (ref 0–45)
ANION GAP SERPL CALCULATED.3IONS-SCNC: 8 MMOL/L (ref 5–18)
APTT PPP: 38 SECONDS (ref 24–37)
AST SERPL W P-5'-P-CCNC: 137 U/L (ref 0–40)
BILIRUB SERPL-MCNC: 0.4 MG/DL (ref 0–1)
BUN SERPL-MCNC: 9 MG/DL (ref 8–22)
CALCIUM SERPL-MCNC: 8.9 MG/DL (ref 8.5–10.5)
CHLORIDE BLD-SCNC: 103 MMOL/L (ref 98–107)
CHOLEST SERPL-MCNC: 263 MG/DL
CO2 SERPL-SCNC: 30 MMOL/L (ref 22–31)
CREAT SERPL-MCNC: 0.78 MG/DL (ref 0.7–1.3)
FASTING STATUS PATIENT QL REPORTED: NO
GFR SERPL CREATININE-BSD FRML MDRD: >60 ML/MIN/1.73M2
GLUCOSE BLD-MCNC: 95 MG/DL (ref 70–125)
HDLC SERPL-MCNC: 141 MG/DL
LDLC SERPL CALC-MCNC: 106 MG/DL
POTASSIUM BLD-SCNC: 3.8 MMOL/L (ref 3.5–5)
PROT SERPL-MCNC: 8 G/DL (ref 6–8)
SODIUM SERPL-SCNC: 141 MMOL/L (ref 136–145)
TRIGL SERPL-MCNC: 80 MG/DL

## 2020-02-11 ENCOUNTER — OFFICE VISIT - HEALTHEAST (OUTPATIENT)
Dept: PHYSICAL THERAPY | Facility: REHABILITATION | Age: 35
End: 2020-02-11

## 2020-02-11 DIAGNOSIS — Z87.81 S/P ORIF (OPEN REDUCTION INTERNAL FIXATION) FRACTURE: ICD-10-CM

## 2020-02-11 DIAGNOSIS — Z98.890 S/P ORIF (OPEN REDUCTION INTERNAL FIXATION) FRACTURE: ICD-10-CM

## 2021-04-29 ENCOUNTER — RECORDS - HEALTHEAST (OUTPATIENT)
Dept: LAB | Facility: CLINIC | Age: 36
End: 2021-04-29

## 2021-04-29 LAB
ALBUMIN SERPL-MCNC: 4.3 G/DL (ref 3.5–5)
ALP SERPL-CCNC: 58 U/L (ref 45–120)
ALT SERPL W P-5'-P-CCNC: 18 U/L (ref 0–45)
ANION GAP SERPL CALCULATED.3IONS-SCNC: 12 MMOL/L (ref 5–18)
AST SERPL W P-5'-P-CCNC: 20 U/L (ref 0–40)
BILIRUB SERPL-MCNC: 0.6 MG/DL (ref 0–1)
BUN SERPL-MCNC: 11 MG/DL (ref 8–22)
CALCIUM SERPL-MCNC: 9.3 MG/DL (ref 8.5–10.5)
CHLORIDE BLD-SCNC: 102 MMOL/L (ref 98–107)
CO2 SERPL-SCNC: 24 MMOL/L (ref 22–31)
CREAT SERPL-MCNC: 0.83 MG/DL (ref 0.7–1.3)
GFR SERPL CREATININE-BSD FRML MDRD: >60 ML/MIN/1.73M2
GLUCOSE BLD-MCNC: 108 MG/DL (ref 70–125)
POTASSIUM BLD-SCNC: 3.6 MMOL/L (ref 3.5–5)
PROT SERPL-MCNC: 7.2 G/DL (ref 6–8)
SODIUM SERPL-SCNC: 138 MMOL/L (ref 136–145)

## 2021-04-30 LAB
HAV IGM SERPL QL IA: NEGATIVE
HBV CORE IGM SERPL QL IA: NEGATIVE
HBV SURFACE AG SERPL QL IA: NEGATIVE
HCV AB SERPL QL IA: NEGATIVE

## 2021-06-01 NOTE — PROGRESS NOTES
[facility]   Knee Initial Evaluation    Patient Name: Deangelo Leahy  Date of evaluation: 9/27/2019  Referral Diagnosis:  S/p L ORIF tibial plateau  Referring provider: Sujatha Ly PA-C  Visit Diagnosis:     ICD-10-CM    1. S/P ORIF (open reduction internal fixation) fracture Z96.7     Z87.81    2. Tibial plateau fracture, left S82.142A        Assessment:        Patient is a 34 y.o. male that presents with signs and symptoms consistent with left knee pain secondary to definitive ORIF for severe depression of lateral tibial plateau and bicondylar involvement on 8/30/19. Patient demonstrates impairments including decreased knee range of motion, functional strength and joint mobility, with TTWB and crutches with ambulation leading to impaired functional mobility. Patient's functional limitations include walking or standing for longer periods of time, sit to stand transfers and performing generalized daily activity due to WB restriction, brace and crutches. Today patient responded well to patient education and therapeutic exercise.    Patient educated, demonstrated understanding and is in agreement with nature of impairment, plan of care, patient role and HEP. Patient compliant with PT and prognosis is good. Patient would benefit from skilled PT to progress and improve above impairments.    The POC is dynamic and will be modified on an ongoing basis.  Patient will return to clinic if symptoms persist.  Barriers to achieving goals as noted in the assessment section may affect outcome.  Prognosis to achieve goals is  good   Pt. is appropriate for skilled PT intervention as outlined in the Plan of Care (POC).  Pt. is a good candidate for skilled PT services to improve pain levels and function.    Goals:  Pt. will be independent with home exercise program in : 6 weeks  Pt. will be able to walk : 20 minutes;with PWB;on uneven/inclined surfaces;with less pain;with less difficulty;for community  mobility;in 12 weeks  Patient will stand : 10 minutes;with no pain;with less difficultty;for home chores;for work;in 12 weeks  Patient will ascend / descend: stairs;with railing;with recipricol gait;with no pain;with less difficulty;in 12 weeks    Pt will: demonstrate full range of motion of left knee extension and flexion by 12 weeks.       Patient's expectations/goals are realistic.    Barriers to Learning or Achieving Goals:  No Barriers.       Plan / Patient Instructions:        Plan of Care:   Communication with: Referral Source  Patient Related Instruction: Nature of Condition;Treatment plan and rationale;Self Care instruction;Basis of treatment;Body mechanics;Posture;Next steps;Expected outcome  Times per Week: 1-2  Number of Weeks: 6-8  Number of Visits: 12  Discharge Planning: independent HEP and/or plateau of progress  Precautions / Restrictions : Non weight bear left lower leg for 10 weeks, hinged brace 6 weeks - order states TTWB  Therapeutic Exercise: ROM;Stretching;Strengthening  Neuromuscular Reeducation: kinesio tape;posture;balance/proprioception;TNE;core  Manual Therapy: soft tissue mobilization;myofascial release;joint mobilization;muscle energy  Modalities: TENS  Gait Training: as indicated      POC and pathology of condition were reviewed with patient.  Pt. is in agreement with the Plan of Care  A Home Exercise Program (HEP) was initiated today.  Pt. was instructed in exercises by PT and patient was given a handout with detailed instructions.    Plan for next visit: review HEP, wall slides, NMES as indicated, standing hip abd, SL squatting on RLE, ankle strengthening NWB, seated gastroc stretch, HF stretch at edge of bed      Subjective:        History of Present Illness:    Deangelo is a 34 y.o. male who presents to therapy today with complaints of left knee pain. Date of onset is 8/30/19 - 4 weeks ago, and onset was s/p ORIF L tibial plateau. Symptoms are intermittent and getting better. He  denies history of similar symptoms. He describes their previous level of function as not limited. Order after surgery said 10 weeks NWB, but after 6 weeks it is TTWB. Patient sees the physician again October 7th. Patient didn't get any exercises from [facility].     Pain Rating:generalized discomfort more than anything  Pain rating at best: 1  Pain rating at worst: 8  Pain description:aching and dull - weakness     Functional limitations are described as occurring with:   ascending and descending stairs or curbs  performing routine daily activities  standing 20 minutes  transitional movements getting in  bed and car and getting out of  bed and car  walking for longer than 20 minutes    Patient reports benefit from:  rest      Social information:   Living Situation:single family home and stairs  with railing   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 duty work has to get dr to sign off on that   Equipment Available: crutches       Objective:      Note: Items left blank indicates the item was not performed or not indicated at the time of the evaluation.    Knee Examination  1. S/P ORIF (open reduction internal fixation) fracture     2. Tibial plateau fracture, left       Involved Side: Left  Posture Observation:      General sitting posture is  normal.  General standing posture is normal.  Assistive Device: Crutches  Gait Observation: NWB on LLE  Lumbar Clearing: Does not provoke symptoms  Hip Clearing: Does not provoke symptoms      Knee ROM Within normal limits unless otherwise indicated     Date:  9/27/19    AROM in degrees  Right   Left  Right   Left  Right   Left       Knee Flexion  (130 )   80                      Knee Extension  (0 )   18                    PROM in degrees  Right   Left  Right   Left  Right   Left       Knee Flexion  (130 )   86                      Knee Extension  (0 )                       LE Strength    Within normal limits unless otherwise  indicated               Date:  9/27/19    Flexibility:  Decreased of gastroc/soleus, hamstrings, quads, HF    Palpation:  TTP at gastroc, anterior knee joint    Treatment Today       Patient Education: Patient educated on plan of care, prognosis, PT/patient role and HEP. Patient educated on impairments related to condition and reproduction of symptoms. Patient instructed to focus on the small goals and this may be a long process to recovery, and that exercises at home are just as important as coming to therapy. Patient was educated on importance of exercise consistency and activity modification in order to see progress and change. Patient demonstrated and verbalized understanding.       Exercises:  Exercise #1: supine quad set - hold 5 sec x 10-20  Comment #1: SAQ - hold 5 sec x 10-20  Exercise #2: supine or seated heel slides - hold 5 sec x 10-20  Comment #2: wall slides - 10 reps - not to HEP  Exercise #3: seated hamstring stretch - hold 30 sec           TREATMENT MINUTES COMMENTS   Evaluation 25    Self-care/ Home management     Manual therapy     Neuromuscular Re-education     Therapeutic Activity     Therapeutic Exercises 25 See Flowsheet   Gait training     Modality__________________                Total 50    Blank areas are intentional and mean the treatment did not include these items.     PT Evaluation Code: (Please list factors)  Patient History/Comorbidities: s/p ORIF  Examination: decreased mobility increased pain  Clinical Presentation: stable  Clinical Decision Making: low    Patient History/  Comorbidities Examination  (body structures and functions, activity limitations, and/or participation restrictions) Clinical Presentation Clinical Decision Making (Complexity)   No documented Comorbidities or personal factors 1-2 Elements Stable and/or uncomplicated Low   1-2 documented comorbidities or personal factor 3 Elements Evolving clinical presentation with changing characteristics Moderate   3-4  documented comorbidities or personal factors 4 or more Unstable and unpredictable High                Cyndi Wallace, PT  9/27/2019  6:46 AM

## 2021-06-01 NOTE — PROGRESS NOTES
[facility] Daily Progress     Patient Name: Deangelo Leahy  Date: 10/1/2019  Visit #: 2/12  Referral Diagnosis: s/p L ORIF  Referring provider: Sujatha Ly PA-C  Visit Diagnosis:     ICD-10-CM    1. S/P ORIF (open reduction internal fixation) fracture Z98.890     Z87.81    2. Tibial plateau fracture, left S82.142A        Assessment:     Today patient returns for 1st follow up visit and demonstrates improvements in knee flexion at wall with increasing to 100deg after therapeutic exercise and manual therapy today. Patient was able to tolerate upright bike with full revolutions, with brace on and using RLE for extra push.     From Eval:  Patient is a 34 y.o. male that presents with signs and symptoms consistent with left knee pain secondary to definitive ORIF for severe depression of lateral tibial plateau and bicondylar involvement on 8/30/19. Patient demonstrates impairments including decreased knee range of motion, functional strength and joint mobility, with TTWB and crutches with ambulation leading to impaired functional mobility. Patient's functional limitations include walking or standing for longer periods of time, sit to stand transfers and performing generalized daily activity due to WB restriction, brace and crutches.      HEP/POC compliance is  good .  Patient demonstrates understanding/independence with home program.  Patient is appropriate to continue with skilled physical therapy intervention, as indicated by initial plan of care.    Goal Status:  Pt. will be independent with home exercise program in : 6 weeks  Pt. will be able to walk : 20 minutes;with PWB;on uneven/inclined surfaces;with less pain;with less difficulty;for community mobility;in 12 weeks  Patient will stand : 10 minutes;with no pain;with less difficultty;for home chores;for work;in 12 weeks  Patient will ascend / descend: stairs;with railing;with recipricol gait;with no pain;with less difficulty;in 12 weeks    Pt  will: demonstrate full range of motion of left knee extension and flexion by 12 weeks.         Plan / Patient Education:     Continue with initial plan of care.  Progress with home program as tolerated.    Plan for next visit: review HEP, wall slides, NMES as indicated, standing hip abd, SL squatting on RLE, ankle strengthening NWB, seated gastroc stretch, HF stretch at edge of bed     Subjective:     Patient reports he often feels unstable, but still no WB. Has been performing exercises at home.     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 duty work has to get dr to sign off on that    Objective:     Knee ROM      Within normal limits unless otherwise indicated     Date:  9/27/19    AROM in degrees  Right   Left  Right   Left  Right   Left       Knee Flexion  (130 )   80                      Knee Extension  (0 )   18                    PROM in degrees  Right   Left  Right   Left  Right   Left       Knee Flexion  (130 )   86      100                  Treatment Today       Patient Education: Patient was educated on continuing plan of care, progress and review of current HEP. Patient educated on importance of consistency with exercise and therapy, as well as activity modification in order to see change and improvements. Patient demonstrated and verbalized understanding.     Manual Therapy:  Anterior and posterior L knee mobilizations in supine with knee bent to 70 - patient demonstrated improvement of ROM after tx today    Exercises:  Exercise #1: supine quad set - hold 5 sec x 10-20  Comment #1: SAQ - hold 5 sec x 10-20  Exercise #2: supine or seated heel slides - hold 5 sec x 10-20  Comment #2: wall slides - 10 reps - not to HEP  Exercise #3: seated hamstring stretch - hold 30 sec   Comment #3: standing gastroc/soleus stretch - hold 30 sec  Exercise #4: standing hip abd on L - 10 reps        TREATMENT MINUTES COMMENTS   Evaluation     Self-care/ Home management      Manual therapy 10 Anterior and posterior L knee mobilizations in supine with knee bent to 70 - patient demonstrated improvement of ROM after tx today   Neuromuscular Re-education     Therapeutic Activity     Therapeutic Exercises 15 See above flowsheet  trialed upright bike - 3 min, able to perform full revolutions    Gait training     Modality__________________                Total 25    Blank areas are intentional and mean the treatment did not include these items.       Cyndi Wallace, PT  10/1/2019

## 2021-06-02 NOTE — PROGRESS NOTES
[facility] Daily Progress     Patient Name: Deangelo Leahy  Date: 10/8/2019  Visit #: 4/12  Referral Diagnosis: s/p L ORIF  Referring provider: Sujatha Ly PA-C  Visit Diagnosis:     ICD-10-CM    1. S/P ORIF (open reduction internal fixation) fracture Z98.890     Z87.81    2. Tibial plateau fracture, left S82.142A        Assessment:     Today patient returns for follow up visit, week 6 after surgery and demonstrates stable knee motion today, 20 deg away from full extension and 103 deg flexion. Patient was able to tolerate upright bike with full revolutions, without brace on and using RLE for extra push.     From Eval:  Patient is a 34 y.o. male that presents with signs and symptoms consistent with left knee pain secondary to definitive ORIF for severe depression of lateral tibial plateau and bicondylar involvement on 8/30/19. Patient demonstrates impairments including decreased knee range of motion, functional strength and joint mobility, with TTWB and crutches with ambulation leading to impaired functional mobility. Patient's functional limitations include walking or standing for longer periods of time, sit to stand transfers and performing generalized daily activity due to WB restriction, brace and crutches.      HEP/POC compliance is  good .  Patient demonstrates understanding/independence with home program.  Patient is appropriate to continue with skilled physical therapy intervention, as indicated by initial plan of care.    Goal Status:  Pt. will be independent with home exercise program in : 6 weeks  Pt. will be able to walk : 20 minutes;with PWB;on uneven/inclined surfaces;with less pain;with less difficulty;for community mobility;in 12 weeks  Patient will stand : 10 minutes;with no pain;with less difficultty;for home chores;for work;in 12 weeks  Patient will ascend / descend: stairs;with railing;with recipricol gait;with no pain;with less difficulty;in 12 weeks    Pt will: demonstrate  full range of motion of left knee extension and flexion by 12 weeks.         Plan / Patient Education:     Continue with initial plan of care.  Progress with home program as tolerated.    Plan for next visit: review HEP, wall slides, NMES as indicated, standing hip abd, SL squatting on RLE, ankle strengthening NWB, seated gastroc stretch, HF stretch at edge of bed     Subjective:     Patient did go to surgeons PA and no new concerns. Compression sock is needed, Patient without the brace today, a little bit different moving around. Xray is showing it is healing.   Patient reports he often feels unstable, but still no WB. Has been performing exercises at home.     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 duty work has to get dr to sign off on that    Objective:     Knee ROM      Within normal limits unless otherwise indicated     Date:  9/27/19    AROM in degrees  Right   Left  Right   Left  Right   Left       Knee Flexion  (130 )   80                      Knee Extension  (0 )   18                    PROM in degrees  Right   Left  Right   Left  Right   Left       Knee Flexion  (130 )   86      100                  Treatment Today       Patient Education: Patient was educated on continuing plan of care, progress and review of current HEP. Patient educated on importance of consistency with exercise and therapy, as well as activity modification in order to see change and improvements. Patient demonstrated and verbalized understanding.       Exercises:  Exercise #1: supine quad set - hold 5 sec x 10-20  Comment #1: SAQ - hold 5 sec x 10-20  Exercise #2: supine or seated heel slides - hold 5 sec x 10-20  Comment #2: wall slides - 10 reps - not to HEP  Exercise #3: seated hamstring stretch - hold 30 sec   Comment #3: standing gastroc/soleus stretch - hold 30 sec  Exercise #4: standing hip abd on L - 10 reps        TREATMENT MINUTES COMMENTS   Evaluation     Self-care/ Home  management     Manual therapy 20 Anterior and posterior L knee mobilizations in supine with knee bent to 70 - patient demonstrated improvement of ROM after tx today  Prone knee hangs - 5 min with mobilizations  - not today   Neuromuscular Re-education     Therapeutic Activity     Therapeutic Exercises 10 See above flowsheet  trialed upright bike - 3 min, able to perform full revolutions    Gait training     Modality__________________                Total 30    Blank areas are intentional and mean the treatment did not include these items.       Cyndi Wallace, PT  10/8/2019

## 2021-06-02 NOTE — PROGRESS NOTES
[facility] Daily Progress     Patient Name: Deangelo Leahy  Date: 10/4/2019  Visit #: 3/12  Referral Diagnosis: s/p L ORIF  Referring provider: Sujatha Ly PA-C  Visit Diagnosis:     ICD-10-CM    1. S/P ORIF (open reduction internal fixation) fracture Z98.890     Z87.81    2. Tibial plateau fracture, left S82.142A        Assessment:     Today patient returns for 2nd follow up visit, week 5 after surgery and demonstrates a decline in knee motion today, 27 deg away from full extension and only 90 deg flexion, less than what it was at last visit, most likely due to swelling. Patient was able to tolerate upright bike with full revolutions, with brace on and using RLE for extra push.     From Eval:  Patient is a 34 y.o. male that presents with signs and symptoms consistent with left knee pain secondary to definitive ORIF for severe depression of lateral tibial plateau and bicondylar involvement on 8/30/19. Patient demonstrates impairments including decreased knee range of motion, functional strength and joint mobility, with TTWB and crutches with ambulation leading to impaired functional mobility. Patient's functional limitations include walking or standing for longer periods of time, sit to stand transfers and performing generalized daily activity due to WB restriction, brace and crutches.      HEP/POC compliance is  good .  Patient demonstrates understanding/independence with home program.  Patient is appropriate to continue with skilled physical therapy intervention, as indicated by initial plan of care.    Goal Status:  Pt. will be independent with home exercise program in : 6 weeks  Pt. will be able to walk : 20 minutes;with PWB;on uneven/inclined surfaces;with less pain;with less difficulty;for community mobility;in 12 weeks  Patient will stand : 10 minutes;with no pain;with less difficultty;for home chores;for work;in 12 weeks  Patient will ascend / descend: stairs;with railing;with  recipricol gait;with no pain;with less difficulty;in 12 weeks    Pt will: demonstrate full range of motion of left knee extension and flexion by 12 weeks.         Plan / Patient Education:     Continue with initial plan of care.  Progress with home program as tolerated.    Plan for next visit: review HEP, wall slides, NMES as indicated, standing hip abd, SL squatting on RLE, ankle strengthening NWB, seated gastroc stretch, HF stretch at edge of bed     Subjective:     Patient reports he often feels unstable, but still no WB. Has been performing exercises at home.     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 duty work has to get dr to sign off on that    Objective:     Knee ROM      Within normal limits unless otherwise indicated     Date:  9/27/19    AROM in degrees  Right   Left  Right   Left  Right   Left       Knee Flexion  (130 )   80                      Knee Extension  (0 )   18                    PROM in degrees  Right   Left  Right   Left  Right   Left       Knee Flexion  (130 )   86      100                  Treatment Today       Patient Education: Patient was educated on continuing plan of care, progress and review of current HEP. Patient educated on importance of consistency with exercise and therapy, as well as activity modification in order to see change and improvements. Patient demonstrated and verbalized understanding.     Manual Therapy:  Anterior and posterior L knee mobilizations in supine with knee bent to 70 - patient demonstrated improvement of ROM after tx today    Exercises:  Exercise #1: supine quad set - hold 5 sec x 10-20  Comment #1: SAQ - hold 5 sec x 10-20  Exercise #2: supine or seated heel slides - hold 5 sec x 10-20  Comment #2: wall slides - 10 reps - not to HEP  Exercise #3: seated hamstring stretch - hold 30 sec   Comment #3: standing gastroc/soleus stretch - hold 30 sec  Exercise #4: standing hip abd on L - 10  reps        TREATMENT MINUTES COMMENTS   Evaluation     Self-care/ Home management     Manual therapy 20 Anterior and posterior L knee mobilizations in supine with knee bent to 70 - patient demonstrated improvement of ROM after tx today  Prone knee hangs - 5 min with mobilizations    Neuromuscular Re-education     Therapeutic Activity     Therapeutic Exercises 10 See above flowsheet  trialed upright bike - 3 min, able to perform full revolutions    Gait training     Modality__________________                Total 30    Blank areas are intentional and mean the treatment did not include these items.       Cyndi Wallace, PT  10/4/2019

## 2021-06-02 NOTE — PROGRESS NOTES
[facility] Daily Progress     Patient Name: Deangelo Leahy  Date: 11/1/2019  Visit #: 11/12  Referral Diagnosis: s/p L ORIF  Referring provider: Sujatha Ly PA-C  Visit Diagnosis:     ICD-10-CM    1. S/P ORIF (open reduction internal fixation) fracture Z98.890     Z87.81    2. Tibial plateau fracture, left S82.142A        Assessment:     Today patient returns for follow up visit, week 9 after surgery and demonstrates stable knee motion today, 2-3deg from flexion after treatment today, 125 flexion. Patient was able to tolerate upright bike with full revolutions, without brace on and using RLE for extra push. NMES has been used, demonstrated marked improvement in knee extension after treatment today.     From Eval:  Patient is a 34 y.o. male that presents with signs and symptoms consistent with left knee pain secondary to definitive ORIF for severe depression of lateral tibial plateau and bicondylar involvement on 8/30/19. Patient demonstrates impairments including decreased knee range of motion, functional strength and joint mobility, with TTWB and crutches with ambulation leading to impaired functional mobility. Patient's functional limitations include walking or standing for longer periods of time, sit to stand transfers and performing generalized daily activity due to WB restriction, brace and crutches.      HEP/POC compliance is  good .  Patient demonstrates understanding/independence with home program.  Patient is appropriate to continue with skilled physical therapy intervention, as indicated by initial plan of care.    Goal Status:  Pt. will be independent with home exercise program in : 6 weeks  Pt. will be able to walk : 20 minutes;with PWB;on uneven/inclined surfaces;with less pain;with less difficulty;for community mobility;in 12 weeks  Patient will stand : 10 minutes;with no pain;with less difficultty;for home chores;for work;in 12 weeks  Patient will ascend / descend: stairs;with  railing;with recipricol gait;with no pain;with less difficulty;in 12 weeks    Pt will: demonstrate full range of motion of left knee extension and flexion by 12 weeks.         Plan / Patient Education:     Continue with initial plan of care.  Progress with home program as tolerated.    Plan for next visit: review HEP, wall slides, NMES as indicated, standing hip abd, SL squatting on RLE, ankle strengthening NWB, seated gastroc stretch, HF stretch at edge of bed     Subjective:     R knee feels swollen more than normal.   End of the day typically feels better. Some swelling, has been focusing on how much weight bearing he is doing. Will find out Monday how much WB he is allowed to do.     Patient did go to surgeons PA and no new concerns. Compression sock is needed, Patient without the brace today. Xray is showing it is healing. 11/4 he returns to surgeon    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 duty work has to get dr to sign off on that    Objective:     Knee ROM      Within normal limits unless otherwise indicated         AROM in degrees  Right   Left  Right   Left  Right   Left       Knee Flexion  (130 )   80                      Knee Extension  (0 )   18                    PROM in degrees  Right   Left  Right   Left  Right   Left       Knee Flexion  (130 )   86      100                  Treatment Today       Patient Education:  -EDU on swelling and icing throughout the day, need for compression socks      Exercises:  Exercise #1: supine quad set - hold 5 sec x 10-20  Comment #1: SAQ - hold 5 sec x 10-20  Exercise #2: supine or seated heel slides - hold 5 sec x 10-20  Comment #2: wall slides - 10 reps - not to HEP  Exercise #3: seated hamstring stretch - hold 30 sec   Comment #3: standing gastroc/soleus stretch - hold 30 sec  Exercise #4: standing hip abd on L - 10 reps  Comment #4: sidelying hip abduction - 10 reps  Exercise #5: sidelying clamshells - 15  reps  Comment #5: supine HF stretch - hold 30 sec   Exercise #6: prone hamstring curls - hold 5 sec x 10        TREATMENT MINUTES COMMENTS   Evaluation     Self-care/ Home management     Manual therapy 15 Anterior and posterior L knee mobilizations in supine with knee bent to 70 - patient demonstrated improvement of ROM after tx today   Prone knee hangs - 5 min with mobilizations - not today  MFR of L medial hamstring in prone    Neuromuscular Re-education Not today NMES 15 min on L quad, 2 electrodes, 10s on 10 off, used with quad set, progressed to SAQ, 46mz   Therapeutic Activity     Therapeutic Exercises 15 See above flowsheet  trialed upright bike - 3 min, able to perform full revolutions  At seat height progressed to 4   Gait training     Modality__________________                Total 30    Blank areas are intentional and mean the treatment did not include these items.       Cyndi Wallace, PT  11/1/2019

## 2021-06-02 NOTE — PROGRESS NOTES
[facility] Daily Progress     Patient Name: Deangelo Leahy  Date: 10/29/2019  Visit #: 10/12  Referral Diagnosis: s/p L ORIF  Referring provider: Sujatha Ly PA-C  Visit Diagnosis:     ICD-10-CM    1. S/P ORIF (open reduction internal fixation) fracture Z98.890     Z87.81    2. Tibial plateau fracture, left S82.142A        Assessment:     Today patient returns for follow up visit, week 9 after surgery and demonstrates stable knee motion today, 4deg from flexion after treatment today, 123 flexion. Patient was able to tolerate upright bike with full revolutions, without brace on and using RLE for extra push. NMES has been used, demonstrated marked improvement in knee extension after treatment today.     From Eval:  Patient is a 34 y.o. male that presents with signs and symptoms consistent with left knee pain secondary to definitive ORIF for severe depression of lateral tibial plateau and bicondylar involvement on 8/30/19. Patient demonstrates impairments including decreased knee range of motion, functional strength and joint mobility, with TTWB and crutches with ambulation leading to impaired functional mobility. Patient's functional limitations include walking or standing for longer periods of time, sit to stand transfers and performing generalized daily activity due to WB restriction, brace and crutches.      HEP/POC compliance is  good .  Patient demonstrates understanding/independence with home program.  Patient is appropriate to continue with skilled physical therapy intervention, as indicated by initial plan of care.    Goal Status:  Pt. will be independent with home exercise program in : 6 weeks  Pt. will be able to walk : 20 minutes;with PWB;on uneven/inclined surfaces;with less pain;with less difficulty;for community mobility;in 12 weeks  Patient will stand : 10 minutes;with no pain;with less difficultty;for home chores;for work;in 12 weeks  Patient will ascend / descend: stairs;with  railing;with recipricol gait;with no pain;with less difficulty;in 12 weeks    Pt will: demonstrate full range of motion of left knee extension and flexion by 12 weeks.         Plan / Patient Education:     Continue with initial plan of care.  Progress with home program as tolerated.    Plan for next visit: review HEP, wall slides, NMES as indicated, standing hip abd, SL squatting on RLE, ankle strengthening NWB, seated gastroc stretch, HF stretch at edge of bed     Subjective:     R knee feels swollen more than normal.   End of the day typically feels better. Some swelling, extension is similar to what it had been before.     Patient did go to surgeons PA and no new concerns. Compression sock is needed, Patient without the brace today. Xray is showing it is healing. 11/4 he returns to surgeon    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 duty work has to get dr to sign off on that    Objective:     Knee ROM      Within normal limits unless otherwise indicated         AROM in degrees  Right   Left  Right   Left  Right   Left       Knee Flexion  (130 )   80                      Knee Extension  (0 )   18                    PROM in degrees  Right   Left  Right   Left  Right   Left       Knee Flexion  (130 )   86      100                  Treatment Today       Patient Education:  -EDU on swelling and icing throughout the day, need for compression socks      Exercises:  Exercise #1: supine quad set - hold 5 sec x 10-20  Comment #1: SAQ - hold 5 sec x 10-20  Exercise #2: supine or seated heel slides - hold 5 sec x 10-20  Comment #2: wall slides - 10 reps - not to HEP  Exercise #3: seated hamstring stretch - hold 30 sec   Comment #3: standing gastroc/soleus stretch - hold 30 sec  Exercise #4: standing hip abd on L - 10 reps  Comment #4: sidelying hip abduction - 10 reps  Exercise #5: sidelying clamshells - 15 reps  Comment #5: supine HF stretch - hold 30 sec   Exercise #6:  prone hamstring curls - hold 5 sec x 10        TREATMENT MINUTES COMMENTS   Evaluation     Self-care/ Home management     Manual therapy 15 Anterior and posterior L knee mobilizations in supine with knee bent to 70 - patient demonstrated improvement of ROM after tx today - not today  Prone knee hangs - 5 min with mobilizations   MFR of L medial hamstring in prone    Neuromuscular Re-education Not today NMES 15 min on L quad, 2 electrodes, 10s on 10 off, used with quad set, progressed to SAQ, 46mz   Therapeutic Activity     Therapeutic Exercises 15 See above flowsheet  trialed upright bike - 3 min, able to perform full revolutions  At seat height progressed to 4   Gait training     Modality__________________                Total 30    Blank areas are intentional and mean the treatment did not include these items.       Cyndi Wallace, PT  10/29/2019

## 2021-06-02 NOTE — PROGRESS NOTES
[facility] Daily Progress     Patient Name: Deangelo Leahy  Date: 10/25/2019  Visit #: 9/12  Referral Diagnosis: s/p L ORIF  Referring provider: Sujatha Ly PA-C  Visit Diagnosis:     ICD-10-CM    1. S/P ORIF (open reduction internal fixation) fracture Z98.890     Z87.81    2. Tibial plateau fracture, left S82.142A        Assessment:     Today patient returns for follow up visit, week 8 after surgery and demonstrates stable knee motion today, 4deg from flexion after treatment today, 115 flexion. Patient was able to tolerate upright bike with full revolutions, without brace on and using RLE for extra push. NMES has been used, demonstrated marked improvement in knee extension after treatment today.     From Eval:  Patient is a 34 y.o. male that presents with signs and symptoms consistent with left knee pain secondary to definitive ORIF for severe depression of lateral tibial plateau and bicondylar involvement on 8/30/19. Patient demonstrates impairments including decreased knee range of motion, functional strength and joint mobility, with TTWB and crutches with ambulation leading to impaired functional mobility. Patient's functional limitations include walking or standing for longer periods of time, sit to stand transfers and performing generalized daily activity due to WB restriction, brace and crutches.      HEP/POC compliance is  good .  Patient demonstrates understanding/independence with home program.  Patient is appropriate to continue with skilled physical therapy intervention, as indicated by initial plan of care.    Goal Status:  Pt. will be independent with home exercise program in : 6 weeks  Pt. will be able to walk : 20 minutes;with PWB;on uneven/inclined surfaces;with less pain;with less difficulty;for community mobility;in 12 weeks  Patient will stand : 10 minutes;with no pain;with less difficultty;for home chores;for work;in 12 weeks  Patient will ascend / descend: stairs;with  railing;with recipricol gait;with no pain;with less difficulty;in 12 weeks    Pt will: demonstrate full range of motion of left knee extension and flexion by 12 weeks.         Plan / Patient Education:     Continue with initial plan of care.  Progress with home program as tolerated.    Plan for next visit: review HEP, wall slides, NMES as indicated, standing hip abd, SL squatting on RLE, ankle strengthening NWB, seated gastroc stretch, HF stretch at edge of bed     Subjective:     Inner muscle of L leg is feeling tightness and some soreness.   End of the day typically feels better. Some swelling, extension is similar to what it had been before.     Patient did go to surgeons PA and no new concerns. Compression sock is needed, Patient without the brace today, a little bit different moving around. Xray is showing it is healing. 11/4 he returns to surgeon    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 duty work has to get dr to sign off on that    Objective:     Knee ROM      Within normal limits unless otherwise indicated         AROM in degrees  Right   Left  Right   Left  Right   Left       Knee Flexion  (130 )   80                      Knee Extension  (0 )   18                    PROM in degrees  Right   Left  Right   Left  Right   Left       Knee Flexion  (130 )   86      100                  Treatment Today       Patient Education:  -EDU on swelling and icing throughout the day, need for compression socks      Exercises:  Exercise #1: supine quad set - hold 5 sec x 10-20  Comment #1: SAQ - hold 5 sec x 10-20  Exercise #2: supine or seated heel slides - hold 5 sec x 10-20  Comment #2: wall slides - 10 reps - not to HEP  Exercise #3: seated hamstring stretch - hold 30 sec   Comment #3: standing gastroc/soleus stretch - hold 30 sec  Exercise #4: standing hip abd on L - 10 reps  Comment #4: sidelying hip abduction - 10 reps  Exercise #5: sidelying clamshells - 15  reps  Comment #5: supine HF stretch - hold 30 sec         TREATMENT MINUTES COMMENTS   Evaluation     Self-care/ Home management     Manual therapy 10 Anterior and posterior L knee mobilizations in supine with knee bent to 70 - patient demonstrated improvement of ROM after tx today - not today  Prone knee hangs - 5 min with mobilizations   MFR of L medial hamstring in prone    Neuromuscular Re-education Not today NMES 15 min on L quad, 2 electrodes, 10s on 10 off, used with quad set, progressed to SAQ, 46mz   Therapeutic Activity     Therapeutic Exercises 20 See above flowsheet  trialed upright bike - 3 min, able to perform full revolutions  At seat height progressed to 4   Gait training     Modality__________________                Total 30    Blank areas are intentional and mean the treatment did not include these items.       Cyndi Wallace, PT  10/25/2019

## 2021-06-02 NOTE — PROGRESS NOTES
[facility] Daily Progress     Patient Name: Deangelo Leahy  Date: 10/15/2019  Visit #: 6/12  Referral Diagnosis: s/p L ORIF  Referring provider: Sujatha Ly PA-C  Visit Diagnosis:     ICD-10-CM    1. S/P ORIF (open reduction internal fixation) fracture Z98.890     Z87.81    2. Tibial plateau fracture, left S82.142A        Assessment:     Today patient returns for follow up visit, week 7 after surgery and demonstrates stable knee motion today, 20 deg away from full extension and 103 deg flexion. Patient was able to tolerate upright bike with full revolutions, without brace on and using RLE for extra push. NMES was used today, demonstrated marked improvement in knee extension after treatment today.     From Eval:  Patient is a 34 y.o. male that presents with signs and symptoms consistent with left knee pain secondary to definitive ORIF for severe depression of lateral tibial plateau and bicondylar involvement on 8/30/19. Patient demonstrates impairments including decreased knee range of motion, functional strength and joint mobility, with TTWB and crutches with ambulation leading to impaired functional mobility. Patient's functional limitations include walking or standing for longer periods of time, sit to stand transfers and performing generalized daily activity due to WB restriction, brace and crutches.      HEP/POC compliance is  good .  Patient demonstrates understanding/independence with home program.  Patient is appropriate to continue with skilled physical therapy intervention, as indicated by initial plan of care.    Goal Status:  Pt. will be independent with home exercise program in : 6 weeks  Pt. will be able to walk : 20 minutes;with PWB;on uneven/inclined surfaces;with less pain;with less difficulty;for community mobility;in 12 weeks  Patient will stand : 10 minutes;with no pain;with less difficultty;for home chores;for work;in 12 weeks  Patient will ascend / descend: stairs;with  railing;with recipricol gait;with no pain;with less difficulty;in 12 weeks    Pt will: demonstrate full range of motion of left knee extension and flexion by 12 weeks.         Plan / Patient Education:     Continue with initial plan of care.  Progress with home program as tolerated.    Plan for next visit: review HEP, wall slides, NMES as indicated, standing hip abd, SL squatting on RLE, ankle strengthening NWB, seated gastroc stretch, HF stretch at edge of bed     Subjective:     Patient did go to surgeons PA and no new concerns. Compression sock is needed, Patient without the brace today, a little bit different moving around. Xray is showing it is healing.   Feeling like it is moving a little bit more, at some point he feels like he can't move it at all, could either be muscles fighting it or just the swelling. Some soreness after last visit but not terrible.     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 duty work has to get dr to sign off on that    Objective:     Knee ROM      Within normal limits unless otherwise indicated     Date:  9/27/19    AROM in degrees  Right   Left  Right   Left  Right   Left       Knee Flexion  (130 )   80                      Knee Extension  (0 )   18                    PROM in degrees  Right   Left  Right   Left  Right   Left       Knee Flexion  (130 )   86      100                  Treatment Today       Patient Education:  -EDU on swelling and icing throughout the day      Exercises:  Exercise #1: supine quad set - hold 5 sec x 10-20  Comment #1: SAQ - hold 5 sec x 10-20  Exercise #2: supine or seated heel slides - hold 5 sec x 10-20  Comment #2: wall slides - 10 reps - not to HEP  Exercise #3: seated hamstring stretch - hold 30 sec   Comment #3: standing gastroc/soleus stretch - hold 30 sec  Exercise #4: standing hip abd on L - 10 reps        TREATMENT MINUTES COMMENTS   Evaluation     Self-care/ Home management     Manual therapy   Anterior and posterior L knee mobilizations in supine with knee bent to 70 - patient demonstrated improvement of ROM after tx today  Prone knee hangs - 5 min with mobilizations  - not today   Neuromuscular Re-education 25 NMES 15 min on L quad, 2 electrodes, 10s on 10 off, used with quad set, progressed to SAQ, 46mz   Therapeutic Activity     Therapeutic Exercises 5 See above flowsheet  trialed upright bike - 3 min, able to perform full revolutions  At seat height 6 - can progress to 7   Gait training     Modality__________________                Total 30    Blank areas are intentional and mean the treatment did not include these items.       Cyndi Wallace, PT  10/15/2019

## 2021-06-02 NOTE — PROGRESS NOTES
[facility] Daily Progress     Patient Name: Deangelo Leahy  Date: 10/22/2019  Visit #: 8/12  Referral Diagnosis: s/p L ORIF  Referring provider: Sujatha Ly PA-C  Visit Diagnosis:     ICD-10-CM    1. S/P ORIF (open reduction internal fixation) fracture Z98.890     Z87.81    2. Tibial plateau fracture, left S82.142A        Assessment:     Today patient returns for follow up visit, week 8 after surgery and demonstrates stable knee motion today, 4deg from flexion after treatment today, 115 flexion. Patient was able to tolerate upright bike with full revolutions, without brace on and using RLE for extra push. NMES was used today, demonstrated marked improvement in knee extension after treatment today.     From Eval:  Patient is a 34 y.o. male that presents with signs and symptoms consistent with left knee pain secondary to definitive ORIF for severe depression of lateral tibial plateau and bicondylar involvement on 8/30/19. Patient demonstrates impairments including decreased knee range of motion, functional strength and joint mobility, with TTWB and crutches with ambulation leading to impaired functional mobility. Patient's functional limitations include walking or standing for longer periods of time, sit to stand transfers and performing generalized daily activity due to WB restriction, brace and crutches.      HEP/POC compliance is  good .  Patient demonstrates understanding/independence with home program.  Patient is appropriate to continue with skilled physical therapy intervention, as indicated by initial plan of care.    Goal Status:  Pt. will be independent with home exercise program in : 6 weeks  Pt. will be able to walk : 20 minutes;with PWB;on uneven/inclined surfaces;with less pain;with less difficulty;for community mobility;in 12 weeks  Patient will stand : 10 minutes;with no pain;with less difficultty;for home chores;for work;in 12 weeks  Patient will ascend / descend: stairs;with  railing;with recipricol gait;with no pain;with less difficulty;in 12 weeks    Pt will: demonstrate full range of motion of left knee extension and flexion by 12 weeks.         Plan / Patient Education:     Continue with initial plan of care.  Progress with home program as tolerated.    Plan for next visit: review HEP, wall slides, NMES as indicated, standing hip abd, SL squatting on RLE, ankle strengthening NWB, seated gastroc stretch, HF stretch at edge of bed     Subjective:     End of the day typically feels better. Some swelling, extension is similar to what it had been before.     Patient did go to surgeons PA and no new concerns. Compression sock is needed, Patient without the brace today, a little bit different moving around. Xray is showing it is healing. 11/4 he returns to surgeon    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 duty work has to get dr to sign off on that    Objective:     Knee ROM      Within normal limits unless otherwise indicated         AROM in degrees  Right   Left  Right   Left  Right   Left       Knee Flexion  (130 )   80                      Knee Extension  (0 )   18                    PROM in degrees  Right   Left  Right   Left  Right   Left       Knee Flexion  (130 )   86      100                  Treatment Today       Patient Education:  -EDU on swelling and icing throughout the day      Exercises:  Exercise #1: supine quad set - hold 5 sec x 10-20  Comment #1: SAQ - hold 5 sec x 10-20  Exercise #2: supine or seated heel slides - hold 5 sec x 10-20  Comment #2: wall slides - 10 reps - not to HEP  Exercise #3: seated hamstring stretch - hold 30 sec   Comment #3: standing gastroc/soleus stretch - hold 30 sec  Exercise #4: standing hip abd on L - 10 reps        TREATMENT MINUTES COMMENTS   Evaluation     Self-care/ Home management     Manual therapy 10 Anterior and posterior L knee mobilizations in supine with knee bent to 70 - patient  demonstrated improvement of ROM after tx today - not today  Prone knee hangs - 5 min with mobilizations    Neuromuscular Re-education Not today NMES 15 min on L quad, 2 electrodes, 10s on 10 off, used with quad set, progressed to SAQ, 46mz   Therapeutic Activity     Therapeutic Exercises 20 See above flowsheet  trialed upright bike - 3 min, able to perform full revolutions  At seat height 6 - progressed to 5   Gait training     Modality__________________                Total 30    Blank areas are intentional and mean the treatment did not include these items.       Cyndi Wallace, PT  10/22/2019

## 2021-06-02 NOTE — PROGRESS NOTES
[facility] Daily Progress     Patient Name: Deangelo Leahy  Date: 10/18/2019  Visit #: 7/12  Referral Diagnosis: s/p L ORIF  Referring provider: Sujatha Ly PA-C  Visit Diagnosis:     ICD-10-CM    1. S/P ORIF (open reduction internal fixation) fracture Z98.890     Z87.81    2. Tibial plateau fracture, left S82.142A        Assessment:     Today patient returns for follow up visit, week 7 after surgery and demonstrates stable knee motion today, 8deg from flexion after treatment today, 115 flexion{ Patient was able to tolerate upright bike with full revolutions, without brace on and using RLE for extra push. NMES was used today, demonstrated marked improvement in knee extension after treatment today.     From Eval:  Patient is a 34 y.o. male that presents with signs and symptoms consistent with left knee pain secondary to definitive ORIF for severe depression of lateral tibial plateau and bicondylar involvement on 8/30/19. Patient demonstrates impairments including decreased knee range of motion, functional strength and joint mobility, with TTWB and crutches with ambulation leading to impaired functional mobility. Patient's functional limitations include walking or standing for longer periods of time, sit to stand transfers and performing generalized daily activity due to WB restriction, brace and crutches.      HEP/POC compliance is  good .  Patient demonstrates understanding/independence with home program.  Patient is appropriate to continue with skilled physical therapy intervention, as indicated by initial plan of care.    Goal Status:  Pt. will be independent with home exercise program in : 6 weeks  Pt. will be able to walk : 20 minutes;with PWB;on uneven/inclined surfaces;with less pain;with less difficulty;for community mobility;in 12 weeks  Patient will stand : 10 minutes;with no pain;with less difficultty;for home chores;for work;in 12 weeks  Patient will ascend / descend: stairs;with  railing;with recipricol gait;with no pain;with less difficulty;in 12 weeks    Pt will: demonstrate full range of motion of left knee extension and flexion by 12 weeks.         Plan / Patient Education:     Continue with initial plan of care.  Progress with home program as tolerated.    Plan for next visit: review HEP, wall slides, NMES as indicated, standing hip abd, SL squatting on RLE, ankle strengthening NWB, seated gastroc stretch, HF stretch at edge of bed     Subjective:     Patient feels that it is getting better, biggest thing is trying to keep it moving with working now. Soreness after last visit.     Patient did go to surgeons PA and no new concerns. Compression sock is needed, Patient without the brace today, a little bit different moving around. Xray is showing it is healing. 11/4 he returns to surgeon    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 duty work has to get dr to sign off on that    Objective:     Knee ROM      Within normal limits unless otherwise indicated     Date:  9/27/19    AROM in degrees  Right   Left  Right   Left  Right   Left       Knee Flexion  (130 )   80                      Knee Extension  (0 )   18                    PROM in degrees  Right   Left  Right   Left  Right   Left       Knee Flexion  (130 )   86      100                  Treatment Today       Patient Education:  -EDU on swelling and icing throughout the day      Exercises:  Exercise #1: supine quad set - hold 5 sec x 10-20  Comment #1: SAQ - hold 5 sec x 10-20  Exercise #2: supine or seated heel slides - hold 5 sec x 10-20  Comment #2: wall slides - 10 reps - not to HEP  Exercise #3: seated hamstring stretch - hold 30 sec   Comment #3: standing gastroc/soleus stretch - hold 30 sec  Exercise #4: standing hip abd on L - 10 reps        TREATMENT MINUTES COMMENTS   Evaluation     Self-care/ Home management     Manual therapy  Anterior and posterior L knee mobilizations in  supine with knee bent to 70 - patient demonstrated improvement of ROM after tx today  Prone knee hangs - 5 min with mobilizations  - not today   Neuromuscular Re-education 25 NMES 15 min on L quad, 2 electrodes, 10s on 10 off, used with quad set, progressed to SAQ, 46mz   Therapeutic Activity     Therapeutic Exercises 5 See above flowsheet  trialed upright bike - 3 min, able to perform full revolutions  At seat height 6 - progressed to 5   Gait training     Modality__________________                Total 30    Blank areas are intentional and mean the treatment did not include these items.       Cyndi Wallace, PT  10/18/2019

## 2021-06-02 NOTE — PROGRESS NOTES
[facility] Daily Progress     Patient Name: Deangelo Leahy  Date: 10/11/2019  Visit #: 5/12  Referral Diagnosis: s/p L ORIF  Referring provider: Sujatha Ly PA-C  Visit Diagnosis:     ICD-10-CM    1. S/P ORIF (open reduction internal fixation) fracture Z98.890     Z87.81    2. Tibial plateau fracture, left S82.142A        Assessment:     Today patient returns for follow up visit, week 6 after surgery and demonstrates stable knee motion today, 20 deg away from full extension and 103 deg flexion. Patient was able to tolerate upright bike with full revolutions, without brace on and using RLE for extra push. NMES was used today, demonstrated marked improvement in knee extension after treatment today    From Eval:  Patient is a 34 y.o. male that presents with signs and symptoms consistent with left knee pain secondary to definitive ORIF for severe depression of lateral tibial plateau and bicondylar involvement on 8/30/19. Patient demonstrates impairments including decreased knee range of motion, functional strength and joint mobility, with TTWB and crutches with ambulation leading to impaired functional mobility. Patient's functional limitations include walking or standing for longer periods of time, sit to stand transfers and performing generalized daily activity due to WB restriction, brace and crutches.      HEP/POC compliance is  good .  Patient demonstrates understanding/independence with home program.  Patient is appropriate to continue with skilled physical therapy intervention, as indicated by initial plan of care.    Goal Status:  Pt. will be independent with home exercise program in : 6 weeks  Pt. will be able to walk : 20 minutes;with PWB;on uneven/inclined surfaces;with less pain;with less difficulty;for community mobility;in 12 weeks  Patient will stand : 10 minutes;with no pain;with less difficultty;for home chores;for work;in 12 weeks  Patient will ascend / descend: stairs;with  railing;with recipricol gait;with no pain;with less difficulty;in 12 weeks    Pt will: demonstrate full range of motion of left knee extension and flexion by 12 weeks.         Plan / Patient Education:     Continue with initial plan of care.  Progress with home program as tolerated.    Plan for next visit: review HEP, wall slides, NMES as indicated, standing hip abd, SL squatting on RLE, ankle strengthening NWB, seated gastroc stretch, HF stretch at edge of bed     Subjective:     Patient did go to surgeons PA and no new concerns. Compression sock is needed, Patient without the brace today, a little bit different moving around. Xray is showing it is healing.   Patient reports he often feels unstable, but still no WB. Has been performing exercises at home.     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 duty work has to get dr to sign off on that    Objective:     Knee ROM      Within normal limits unless otherwise indicated     Date:  9/27/19    AROM in degrees  Right   Left  Right   Left  Right   Left       Knee Flexion  (130 )   80                      Knee Extension  (0 )   18                    PROM in degrees  Right   Left  Right   Left  Right   Left       Knee Flexion  (130 )   86      100                  Treatment Today       Patient Education: Patient was educated on continuing plan of care, progress and review of current HEP. Patient educated on importance of consistency with exercise and therapy, as well as activity modification in order to see change and improvements. Patient demonstrated and verbalized understanding.       Exercises:  Exercise #1: supine quad set - hold 5 sec x 10-20  Comment #1: SAQ - hold 5 sec x 10-20  Exercise #2: supine or seated heel slides - hold 5 sec x 10-20  Comment #2: wall slides - 10 reps - not to HEP  Exercise #3: seated hamstring stretch - hold 30 sec   Comment #3: standing gastroc/soleus stretch - hold 30 sec  Exercise #4:  standing hip abd on L - 10 reps        TREATMENT MINUTES COMMENTS   Evaluation     Self-care/ Home management     Manual therapy  Anterior and posterior L knee mobilizations in supine with knee bent to 70 - patient demonstrated improvement of ROM after tx today  Prone knee hangs - 5 min with mobilizations  - not today   Neuromuscular Re-education 25 NMES 15 min on L quad, 2 electrodes, 10s on 10 off, used with quad set, progressed to SAQ, 40mz   Therapeutic Activity     Therapeutic Exercises 5 See above flowsheet  trialed upright bike - 3 min, able to perform full revolutions    Gait training     Modality__________________                Total 30    Blank areas are intentional and mean the treatment did not include these items.       Cyndi Wallace, PT  10/11/2019

## 2021-06-03 NOTE — PROGRESS NOTES
[facility] Daily Progress     Patient Name: Deangelo Leahy  Date: 11/26/2019  Visit #: 18/24 updated order  Referral Diagnosis: s/p L ORIF  Referring provider: Sujatha Ly PA-C  Visit Diagnosis:     ICD-10-CM    1. S/P ORIF (open reduction internal fixation) fracture Z98.890     Z87.81    2. Tibial plateau fracture, left S82.142A        Assessment:     Today patient returns for follow up visit, week 13 after surgery and demonstrates stable knee motion today, 0 deg from flexion after treatment today, 124 flexion. Patient is WBAT, he had been able to walk without crutches for shorter distances, he had been able to walk around the house without increased symptoms or soreness. Patient now WBAT, however cannot tolerate SEC ambulation due to weakness, educated on how to ambulate with a single crutch.    From Eval:  Patient is a 34 y.o. male that presents with signs and symptoms consistent with left knee pain secondary to definitive ORIF for severe depression of lateral tibial plateau and bicondylar involvement on 8/30/19. Patient demonstrates impairments including decreased knee range of motion, functional strength and joint mobility, with TTWB and crutches with ambulation leading to impaired functional mobility. Patient's functional limitations include walking or standing for longer periods of time, sit to stand transfers and performing generalized daily activity due to WB restriction, brace and crutches.      HEP/POC compliance is  good .  Patient demonstrates understanding/independence with home program.  Patient is appropriate to continue with skilled physical therapy intervention, as indicated by initial plan of care.    Goal Status: Ongoing  Pt. will be independent with home exercise program in : 6 weeks  Pt. will be able to walk : 20 minutes;with PWB;on uneven/inclined surfaces;with less pain;with less difficulty;for community mobility;in 12 weeks  Patient will stand : 10 minutes;with no  pain;with less difficultty;for home chores;for work;in 12 weeks  Patient will ascend / descend: stairs;with railing;with recipricol gait;with no pain;with less difficulty;in 12 weeks    Pt will: demonstrate full range of motion of left knee extension and flexion by 12 weeks.       Plan / Patient Education:     Continue with initial plan of care.  Progress with home program as tolerated.    Plan for next visit: review HEP, wall slides, standing hip abd, SL squatting on RLE, ankle strengthening NWB, seated gastroc stretch, HF stretch at edge of bed     Subjective:     Patient does have compression socks and a knee fitting. Tape had kind of helped with the pain symptoms. Patient without cane, only with slight limp. Walking a lot better without crutches    Patient did go to surgeons PA and no new concerns. Compression sock is needed, Patient without the brace today. Xray is showing it is healing.    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 duty work has to get dr to sign off on that    Objective:     Knee ROM      Within normal limits unless otherwise indicated         AROM in degrees  Right   Left  Right   Left  Right   Left       Knee Flexion  (130 )   80               124       Knee Extension  (0 )   18      2         0     PROM in degrees  Right   Left  Right   Left  Right   Left       Knee Flexion  (130 )   86      118                Treatment Today       Patient Education:  -EDU on swelling and icing throughout the day, need for compression socks      Exercises:  Exercise #1: supine quad set - hold 5 sec x 10-20  Comment #1: SAQ - hold 5 sec x 10-20  Exercise #2: supine or seated heel slides - hold 5 sec x 10-20  Comment #2: wall slides - 10 reps - not to HEP  Exercise #3: seated hamstring stretch - hold 30 sec   Comment #3: standing gastroc/soleus stretch - hold 30 sec  Exercise #4: standing hip abd on L - 10 reps  Comment #4: sidelying hip abduction - 10  reps  Exercise #5: sidelying clamshells - 15 reps  Comment #5: supine HF stretch - hold 30 sec   Exercise #6: prone hamstring curls - hold 5 sec x 10  Comment #6: TKE - 10 reps green band  Exercise #7: bridge with hip adduction - 10 reps x 2 sets   Comment #7: SLR - 10  Exercise #8: squats - add to HEP  Comment #8: heel-toe raises - add to HEP  Exercise #9: SLS - hold 10 sec, add to HEP  Comment #9: total gym - 20 reps level 26, SL squat x 10 level 21  Exercise #10: lateral step downs without step to start with x 10   Comment #10: lunge and gait training push off lunge - 10 reps  Exercise #11: sidestepping x 10        TREATMENT MINUTES COMMENTS   Evaluation     Self-care/ Home management     Manual therapy 10 Anterior and posterior L knee mobilizations in supine with knee bent to 70 - not today  Prone knee hangs - 5 min with mobilizations - today  Supine MFR of L medial hamstring with patellar mobilizations and posterior mobilizations - not today   Neuromuscular Re-education Not today NMES 15 min on L quad, 2 electrodes, 10s on 10 off, used with quad set, progressed to SAQ, 46mz   Therapeutic Activity     Therapeutic Exercises 10 See above flowsheet  trialed upright bike - 5 min, able to perform full revolutions  At seat height progressed to 2  Ktape to anterior knee   Gait training 10 Worked on WBAT and gait training in parallel bars, weight shifting forwards/back - unable to work on appropriately without weakness   Modality__________________                Total 30    Blank areas are intentional and mean the treatment did not include these items.       Cyndi Wallace, PT  11/26/2019

## 2021-06-03 NOTE — PROGRESS NOTES
[facility] Daily Progress     Patient Name: Deangelo Leahy  Date: 11/5/2019  Visit #: 12/12  Referral Diagnosis: s/p L ORIF  Referring provider: No ref. provider found  Visit Diagnosis:     ICD-10-CM    1. S/P ORIF (open reduction internal fixation) fracture Z98.890     Z87.81    2. Tibial plateau fracture, left S82.142A        Assessment:     Today patient returns for follow up visit, week 10 after surgery and demonstrates stable knee motion today, 2-3deg from flexion after treatment today, 125 flexion. Patient was able to tolerate upright bike with full revolutions. Patient now WBAT, however cannot tolerate SEC ambulation due to weakness.    From Eval:  Patient is a 34 y.o. male that presents with signs and symptoms consistent with left knee pain secondary to definitive ORIF for severe depression of lateral tibial plateau and bicondylar involvement on 8/30/19. Patient demonstrates impairments including decreased knee range of motion, functional strength and joint mobility, with TTWB and crutches with ambulation leading to impaired functional mobility. Patient's functional limitations include walking or standing for longer periods of time, sit to stand transfers and performing generalized daily activity due to WB restriction, brace and crutches.      HEP/POC compliance is  good .  Patient demonstrates understanding/independence with home program.  Patient is appropriate to continue with skilled physical therapy intervention, as indicated by initial plan of care.    Goal Status:  Pt. will be independent with home exercise program in : 6 weeks  Pt. will be able to walk : 20 minutes;with PWB;on uneven/inclined surfaces;with less pain;with less difficulty;for community mobility;in 12 weeks  Patient will stand : 10 minutes;with no pain;with less difficultty;for home chores;for work;in 12 weeks  Patient will ascend / descend: stairs;with railing;with recipricol gait;with no pain;with less difficulty;in  12 weeks    Pt will: demonstrate full range of motion of left knee extension and flexion by 12 weeks.         Plan / Patient Education:     Continue with initial plan of care.  Progress with home program as tolerated.    Plan for next visit: review HEP, wall slides, standing hip abd, SL squatting on RLE, ankle strengthening NWB, seated gastroc stretch, HF stretch at edge of bed     Subjective:     Patient can be WBAT, swelling has been okay, patient knows he should wear compression socks a little more than he should. Patient is on same work restriction.     Patient did go to surgeons PA and no new concerns. Compression sock is needed, Patient without the brace today. Xray is showing it is healing.    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 duty work has to get dr to sign off on that    Objective:     Knee ROM      Within normal limits unless otherwise indicated         AROM in degrees  Right   Left  Right   Left  Right   Left       Knee Flexion  (130 )   80                      Knee Extension  (0 )   18                    PROM in degrees  Right   Left  Right   Left  Right   Left       Knee Flexion  (130 )   86      100                  Treatment Today       Patient Education:  -EDU on swelling and icing throughout the day, need for compression socks      Exercises:  Exercise #1: supine quad set - hold 5 sec x 10-20  Comment #1: SAQ - hold 5 sec x 10-20  Exercise #2: supine or seated heel slides - hold 5 sec x 10-20  Comment #2: wall slides - 10 reps - not to HEP  Exercise #3: seated hamstring stretch - hold 30 sec   Comment #3: standing gastroc/soleus stretch - hold 30 sec  Exercise #4: standing hip abd on L - 10 reps  Comment #4: sidelying hip abduction - 10 reps  Exercise #5: sidelying clamshells - 15 reps  Comment #5: supine HF stretch - hold 30 sec   Exercise #6: prone hamstring curls - hold 5 sec x 10  Comment #6: TKE - 10 reps orange band  Exercise #7:  bridge with hip adduction - 10 reps x 2 sets   Comment #7: SLR - add to HEP         TREATMENT MINUTES COMMENTS   Evaluation     Self-care/ Home management     Manual therapy Not today Anterior and posterior L knee mobilizations in supine with knee bent to 70 - patient demonstrated improvement of ROM after tx today   Prone knee hangs - 5 min with mobilizations - not today  MFR of L medial hamstring in prone    Neuromuscular Re-education Not today NMES 15 min on L quad, 2 electrodes, 10s on 10 off, used with quad set, progressed to SAQ, 46mz   Therapeutic Activity     Therapeutic Exercises 15 See above flowsheet  trialed upright bike - 3 min, able to perform full revolutions  At seat height progressed to 4   Gait training 15 Worked on WBAT and gait training in parallel bars, weight shifting forwards/back, cane training - unable to work on appropriately without weakness   Modality__________________                Total 30    Blank areas are intentional and mean the treatment did not include these items.       Cyndi Wallace, PT  11/5/2019

## 2021-06-03 NOTE — PROGRESS NOTES
[facility] Daily Progress     Patient Name: Deangelo Leahy  Date: 11/8/2019  Visit #: 13/24 updated order  Referral Diagnosis: s/p L ORIF  Referring provider: Sujatha Ly PA-C  Visit Diagnosis:     ICD-10-CM    1. S/P ORIF (open reduction internal fixation) fracture Z98.890     Z87.81    2. Tibial plateau fracture, left S82.142A        Assessment:     Today patient returns for follow up visit, week 10 after surgery and demonstrates stable knee motion today, 2-3deg from flexion after treatment today, 125 flexion. Patient feeling that with WB his muscles are feeling more sore, expected and no increase of discomfort. Patient was able to tolerate upright bike with full revolutions. Patient now WBAT, however cannot tolerate SEC ambulation due to weakness.    From Eval:  Patient is a 34 y.o. male that presents with signs and symptoms consistent with left knee pain secondary to definitive ORIF for severe depression of lateral tibial plateau and bicondylar involvement on 8/30/19. Patient demonstrates impairments including decreased knee range of motion, functional strength and joint mobility, with TTWB and crutches with ambulation leading to impaired functional mobility. Patient's functional limitations include walking or standing for longer periods of time, sit to stand transfers and performing generalized daily activity due to WB restriction, brace and crutches.      HEP/POC compliance is  good .  Patient demonstrates understanding/independence with home program.  Patient is appropriate to continue with skilled physical therapy intervention, as indicated by initial plan of care.    Goal Status: Ongoing  Pt. will be independent with home exercise program in : 6 weeks  Pt. will be able to walk : 20 minutes;with PWB;on uneven/inclined surfaces;with less pain;with less difficulty;for community mobility;in 12 weeks  Patient will stand : 10 minutes;with no pain;with less difficultty;for home chores;for  work;in 12 weeks  Patient will ascend / descend: stairs;with railing;with recipricol gait;with no pain;with less difficulty;in 12 weeks    Pt will: demonstrate full range of motion of left knee extension and flexion by 12 weeks.       Plan / Patient Education:     Continue with initial plan of care.  Progress with home program as tolerated.    Plan for next visit: review HEP, wall slides, standing hip abd, SL squatting on RLE, ankle strengthening NWB, seated gastroc stretch, HF stretch at edge of bed     Subjective:     Patient can be WBAT, swelling has been okay, patient knows he should wear compression socks a little more than he should. Patient is on same work restriction.     Patient did go to surgeons PA and no new concerns. Compression sock is needed, Patient without the brace today. Xray is showing it is healing.    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 duty work has to get dr to sign off on that    Objective:     Knee ROM      Within normal limits unless otherwise indicated         AROM in degrees  Right   Left  Right   Left  Right   Left       Knee Flexion  (130 )   80                      Knee Extension  (0 )   18      2              PROM in degrees  Right   Left  Right   Left  Right   Left       Knee Flexion  (130 )   86      118                  Treatment Today       Patient Education:  -EDU on swelling and icing throughout the day, need for compression socks      Exercises:  Exercise #1: supine quad set - hold 5 sec x 10-20  Comment #1: SAQ - hold 5 sec x 10-20  Exercise #2: supine or seated heel slides - hold 5 sec x 10-20  Comment #2: wall slides - 10 reps - not to HEP  Exercise #3: seated hamstring stretch - hold 30 sec   Comment #3: standing gastroc/soleus stretch - hold 30 sec  Exercise #4: standing hip abd on L - 10 reps  Comment #4: sidelying hip abduction - 10 reps  Exercise #5: sidelying clamshells - 15 reps  Comment #5: supine HF stretch -  hold 30 sec   Exercise #6: prone hamstring curls - hold 5 sec x 10  Comment #6: TKE - 10 reps orange band  Exercise #7: bridge with hip adduction - 10 reps x 2 sets   Comment #7: SLR - add to HEP   Exercise #8: squats - add to HEP  Comment #8: heel-toe raises - add to HEP  Exercise #9: SLS - hold 10 sec, add to HEP  Comment #9: total gym - 20 reps, SL squat x 10        TREATMENT MINUTES COMMENTS   Evaluation     Self-care/ Home management     Manual therapy 10 Anterior and posterior L knee mobilizations in supine with knee bent to 70 - not today  Prone knee hangs - 5 min with mobilizations   MFR of L medial hamstring in prone    Neuromuscular Re-education Not today NMES 15 min on L quad, 2 electrodes, 10s on 10 off, used with quad set, progressed to SAQ, 46mz   Therapeutic Activity     Therapeutic Exercises 10 See above flowsheet  trialed upright bike - 3 min, able to perform full revolutions  At seat height progressed to 4   Gait training 5 Worked on WBAT and gait training in parallel bars, weight shifting forwards/back, cane training - unable to work on appropriately without weakness   Modality__________________                Total 25    Blank areas are intentional and mean the treatment did not include these items.       Cyndi Wallace, PT  11/8/2019

## 2021-06-03 NOTE — PROGRESS NOTES
[facility] Daily Progress     Patient Name: Deangelo Leahy  Date: 11/22/2019  Visit #: 17/24 updated order  Referral Diagnosis: s/p L ORIF  Referring provider: Sujatha Ly PA-C  Visit Diagnosis:     ICD-10-CM    1. S/P ORIF (open reduction internal fixation) fracture Z98.890     Z87.81    2. Tibial plateau fracture, left S82.142A        Assessment:     Today patient returns for follow up visit, week 12 after surgery and demonstrates stable knee motion today, 0 deg from flexion after treatment today, 124 flexion. Patient is WBAT, he had been able to walk without crutches for shorter distances, he had been able to walk around the house without increased symptoms or soreness. Patient now WBAT, however cannot tolerate SEC ambulation due to weakness, educated on how to ambulate with a single crutch.    From Eval:  Patient is a 34 y.o. male that presents with signs and symptoms consistent with left knee pain secondary to definitive ORIF for severe depression of lateral tibial plateau and bicondylar involvement on 8/30/19. Patient demonstrates impairments including decreased knee range of motion, functional strength and joint mobility, with TTWB and crutches with ambulation leading to impaired functional mobility. Patient's functional limitations include walking or standing for longer periods of time, sit to stand transfers and performing generalized daily activity due to WB restriction, brace and crutches.      HEP/POC compliance is  good .  Patient demonstrates understanding/independence with home program.  Patient is appropriate to continue with skilled physical therapy intervention, as indicated by initial plan of care.    Goal Status: Ongoing  Pt. will be independent with home exercise program in : 6 weeks  Pt. will be able to walk : 20 minutes;with PWB;on uneven/inclined surfaces;with less pain;with less difficulty;for community mobility;in 12 weeks  Patient will stand : 10 minutes;with no  pain;with less difficultty;for home chores;for work;in 12 weeks  Patient will ascend / descend: stairs;with railing;with recipricol gait;with no pain;with less difficulty;in 12 weeks    Pt will: demonstrate full range of motion of left knee extension and flexion by 12 weeks.       Plan / Patient Education:     Continue with initial plan of care.  Progress with home program as tolerated.    Plan for next visit: review HEP, wall slides, standing hip abd, SL squatting on RLE, ankle strengthening NWB, seated gastroc stretch, HF stretch at edge of bed     Subjective:     Some soreness at the knee but nothing concerning for him. Some new pains/bruise feeling in front of knee at patellar tendon    Patient did go to surgeons PA and no new concerns. Compression sock is needed, Patient without the brace today. Xray is showing it is healing.    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 duty work has to get dr to sign off on that    Objective:     Knee ROM      Within normal limits unless otherwise indicated         AROM in degrees  Right   Left  Right   Left  Right   Left       Knee Flexion  (130 )   80               124       Knee Extension  (0 )   18      2         0     PROM in degrees  Right   Left  Right   Left  Right   Left       Knee Flexion  (130 )   86      118                  Treatment Today       Patient Education:  -EDU on swelling and icing throughout the day, need for compression socks      Exercises:  Exercise #1: supine quad set - hold 5 sec x 10-20  Comment #1: SAQ - hold 5 sec x 10-20  Exercise #2: supine or seated heel slides - hold 5 sec x 10-20  Comment #2: wall slides - 10 reps - not to HEP  Exercise #3: seated hamstring stretch - hold 30 sec   Comment #3: standing gastroc/soleus stretch - hold 30 sec  Exercise #4: standing hip abd on L - 10 reps  Comment #4: sidelying hip abduction - 10 reps  Exercise #5: sidelying clamshells - 15 reps  Comment #5:  supine HF stretch - hold 30 sec   Exercise #6: prone hamstring curls - hold 5 sec x 10  Comment #6: TKE - 10 reps green band  Exercise #7: bridge with hip adduction - 10 reps x 2 sets   Comment #7: SLR - 10  Exercise #8: squats - add to HEP  Comment #8: heel-toe raises - add to HEP  Exercise #9: SLS - hold 10 sec, add to HEP  Comment #9: total gym - 20 reps level 26, SL squat x 10 level 21  Exercise #10: lateral step downs without step to start with x 10   Comment #10: lunge and gait training push off lunge - 10 reps  Exercise #11: sidestepping x 10        TREATMENT MINUTES COMMENTS   Evaluation     Self-care/ Home management     Manual therapy  Anterior and posterior L knee mobilizations in supine with knee bent to 70 - not today  Prone knee hangs - 5 min with mobilizations - not today  Supine MFR of L medial hamstring with patellar mobilizations and posterior mobilizations   Neuromuscular Re-education Not today NMES 15 min on L quad, 2 electrodes, 10s on 10 off, used with quad set, progressed to SAQ, 46mz   Therapeutic Activity     Therapeutic Exercises 25 See above flowsheet  trialed upright bike - 3 min, able to perform full revolutions  At seat height progressed to 3  Ktape to anterior knee   Gait training 5 Worked on WBAT and gait training in parallel bars, weight shifting forwards/back, cane training - unable to work on appropriately without weakness   Modality__________________                Total 30    Blank areas are intentional and mean the treatment did not include these items.       Cyndi Wallace, PT  11/22/2019

## 2021-06-03 NOTE — PROGRESS NOTES
[facility] Daily Progress     Patient Name: Deangelo Leahy  Date: 11/19/2019  Visit #: 16/24 updated order  Referral Diagnosis: s/p L ORIF  Referring provider: Sujatha Ly PA-C  Visit Diagnosis:     ICD-10-CM    1. S/P ORIF (open reduction internal fixation) fracture Z98.890     Z87.81    2. Tibial plateau fracture, left S82.142A        Assessment:     Today patient returns for follow up visit, week 12 after surgery and demonstrates stable knee motion today, 0 deg from flexion after treatment today, 124 flexion. Patient is WBAT, he had been able to walk without crutches for shorter distances, he had been able to walk around the house without increased symptoms or soreness. Patient now WBAT, however cannot tolerate SEC ambulation due to weakness, educated on how to ambulate with a single crutch.    From Eval:  Patient is a 34 y.o. male that presents with signs and symptoms consistent with left knee pain secondary to definitive ORIF for severe depression of lateral tibial plateau and bicondylar involvement on 8/30/19. Patient demonstrates impairments including decreased knee range of motion, functional strength and joint mobility, with TTWB and crutches with ambulation leading to impaired functional mobility. Patient's functional limitations include walking or standing for longer periods of time, sit to stand transfers and performing generalized daily activity due to WB restriction, brace and crutches.      HEP/POC compliance is  good .  Patient demonstrates understanding/independence with home program.  Patient is appropriate to continue with skilled physical therapy intervention, as indicated by initial plan of care.    Goal Status: Ongoing  Pt. will be independent with home exercise program in : 6 weeks  Pt. will be able to walk : 20 minutes;with PWB;on uneven/inclined surfaces;with less pain;with less difficulty;for community mobility;in 12 weeks  Patient will stand : 10 minutes;with no  pain;with less difficultty;for home chores;for work;in 12 weeks  Patient will ascend / descend: stairs;with railing;with recipricol gait;with no pain;with less difficulty;in 12 weeks    Pt will: demonstrate full range of motion of left knee extension and flexion by 12 weeks.       Plan / Patient Education:     Continue with initial plan of care.  Progress with home program as tolerated.    Plan for next visit: review HEP, wall slides, standing hip abd, SL squatting on RLE, ankle strengthening NWB, seated gastroc stretch, HF stretch at edge of bed     Subjective:     Some soreness at the knee but nothing concerning for him.     Patient did go to surgeons PA and no new concerns. Compression sock is needed, Patient without the brace today. Xray is showing it is healing.    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 duty work has to get dr to sign off on that    Objective:     Knee ROM      Within normal limits unless otherwise indicated         AROM in degrees  Right   Left  Right   Left  Right   Left       Knee Flexion  (130 )   80               124       Knee Extension  (0 )   18      2         0     PROM in degrees  Right   Left  Right   Left  Right   Left       Knee Flexion  (130 )   86      118                  Treatment Today       Patient Education:  -EDU on swelling and icing throughout the day, need for compression socks      Exercises:  Exercise #1: supine quad set - hold 5 sec x 10-20  Comment #1: SAQ - hold 5 sec x 10-20  Exercise #2: supine or seated heel slides - hold 5 sec x 10-20  Comment #2: wall slides - 10 reps - not to HEP  Exercise #3: seated hamstring stretch - hold 30 sec   Comment #3: standing gastroc/soleus stretch - hold 30 sec  Exercise #4: standing hip abd on L - 10 reps  Comment #4: sidelying hip abduction - 10 reps  Exercise #5: sidelying clamshells - 15 reps  Comment #5: supine HF stretch - hold 30 sec   Exercise #6: prone hamstring curls  - hold 5 sec x 10  Comment #6: TKE - 10 reps green band  Exercise #7: bridge with hip adduction - 10 reps x 2 sets   Comment #7: SLR - add to HEP   Exercise #8: squats - add to HEP  Comment #8: heel-toe raises - add to HEP  Exercise #9: SLS - hold 10 sec, add to HEP  Comment #9: total gym - 20 reps level 26, SL squat x 10 level 21  Exercise #10: lateral step downs without step to start with x 10   Comment #10: lunge and gait training push off lunge - 10 reps        TREATMENT MINUTES COMMENTS   Evaluation     Self-care/ Home management     Manual therapy 15 Anterior and posterior L knee mobilizations in supine with knee bent to 70 - not today  Prone knee hangs - 5 min with mobilizations - not today  Supine MFR of L medial hamstring with patellar mobilizations and posterior mobilizations   Neuromuscular Re-education Not today NMES 15 min on L quad, 2 electrodes, 10s on 10 off, used with quad set, progressed to SAQ, 46mz   Therapeutic Activity     Therapeutic Exercises 10 See above flowsheet  trialed upright bike - 3 min, able to perform full revolutions  At seat height progressed to 3   Gait training 5 Worked on WBAT and gait training in parallel bars, weight shifting forwards/back, cane training - unable to work on appropriately without weakness   Modality__________________                Total 30    Blank areas are intentional and mean the treatment did not include these items.       Cyndi Wallace, PT  11/19/2019

## 2021-06-03 NOTE — PROGRESS NOTES
[facility] Daily Progress     Patient Name: Deangelo Leahy  Date: 11/29/2019  Visit #: 19/24 updated order  Referral Diagnosis: s/p L ORIF  Referring provider: Sujatha Ly PA-C  Visit Diagnosis:     ICD-10-CM    1. S/P ORIF (open reduction internal fixation) fracture Z98.890     Z87.81    2. Tibial plateau fracture, left S82.142A        Assessment:     Today patient returns for follow up visit, week 13 after surgery and demonstrates stable knee motion today, 0 deg from flexion after treatment today, 124 flexion. Patient is WBAT, he had been able to walk without crutches for shorter distances and coming into PT without assistive device. Patient having a little more soreness today.    Patient now WBAT and is progressing in therapy with improved gait pattern and increased LLE strength.    From Eval:  Patient is a 34 y.o. male that presents with signs and symptoms consistent with left knee pain secondary to definitive ORIF for severe depression of lateral tibial plateau and bicondylar involvement on 8/30/19. Patient demonstrates impairments including decreased knee range of motion, functional strength and joint mobility, with TTWB and crutches with ambulation leading to impaired functional mobility. Patient's functional limitations include walking or standing for longer periods of time, sit to stand transfers and performing generalized daily activity due to WB restriction, brace and crutches.      HEP/POC compliance is  good .  Patient demonstrates understanding/independence with home program.  Patient is appropriate to continue with skilled physical therapy intervention, as indicated by initial plan of care.    Goal Status: Ongoing  Pt. will be independent with home exercise program in : 6 weeks  Pt. will be able to walk : 20 minutes;with PWB;on uneven/inclined surfaces;with less pain;with less difficulty;for community mobility;in 12 weeks  Patient will stand : 10 minutes;with no pain;with less  difficultty;for home chores;for work;in 12 weeks  Patient will ascend / descend: stairs;with railing;with recipricol gait;with no pain;with less difficulty;in 12 weeks    Pt will: demonstrate full range of motion of left knee extension and flexion by 12 weeks.       Plan / Patient Education:     Continue with initial plan of care.  Progress with home program as tolerated.    Plan for next visit: review HEP, wall slides, standing hip abd, SL squatting on RLE, ankle strengthening NWB, seated gastroc stretch, HF stretch at edge of bed     Subjective:     Patient does have compression socks but hasn't been wearing them much due to uncomfortableness, knee compression fitting he wears seems to help more but can't wear it too much. Patient feeling a little more sore today    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 duty work has to get dr to sign off on that    Objective:       Patient started with 80deg knee flexion and 18 deg lacking of extension  11/29/19: L flexion 125, L extension 1-2 deg lacking   Gait pattern: shortened step length with slight limp on LLE, lack of terminal extension of LLE  Patient demonstrating 5/5 strength in LLE with hip flexion, knee extension, ankle DF, 4/5 knee flexion  Still demonstrates fatigue and leg weakness with function and ambulation     Treatment Today       Patient Education:  -EDU on swelling and icing throughout the day, need for compression socks      Exercises:  Exercise #1: supine quad set - hold 5 sec x 10-20  Comment #1: SAQ - hold 5 sec x 10-20  Exercise #2: supine or seated heel slides - hold 5 sec x 10-20  Comment #2: wall slides - 10 reps - not to HEP  Exercise #3: seated hamstring stretch - hold 30 sec   Comment #3: standing gastroc/soleus stretch - hold 30 sec  Exercise #4: standing hip abd on L - 10 reps  Comment #4: sidelying hip abduction - 10 reps - bilaterally   Exercise #5: sidelying clamshells - 15 reps  Comment  #5: supine HF stretch - hold 30 sec   Exercise #6: prone hamstring curls - hold 5 sec x 10  Comment #6: TKE - 10 reps green band  Exercise #7: bridge with hip adduction - 10 reps x 2 sets   Comment #7: SLR - 10  Exercise #8: squats - add to HEP  Comment #8: heel-toe raises - add to HEP  Exercise #9: SLS - hold 10 sec, add to HEP  Comment #9: total gym - 20 reps level 26, SL squat x 10 level 23  Exercise #10: lateral step downs without step to start with x 10   Comment #10: lunge and gait training push off lunge - 10 reps  Exercise #11: sidestepping x 10        TREATMENT MINUTES COMMENTS   Evaluation     Self-care/ Home management     Manual therapy  Anterior and posterior L knee mobilizations in supine with knee bent to 70 - not today  Prone knee hangs - 5 min with mobilizations - today  Supine MFR of L medial hamstring with patellar mobilizations and posterior mobilizations - not today   Neuromuscular Re-education Not today NMES 15 min on L quad, 2 electrodes, 10s on 10 off, used with quad set, progressed to SAQ, 46mz   Therapeutic Activity     Therapeutic Exercises 20 See above flowsheet  trialed upright bike - 5 min, able to perform full revolutions  At seat height progressed to 2  Ktape to anterior knee   Gait training 10 Worked on WBAT and gait training in parallel bars, weight shifting forwards/back - unable to work on appropriately without weakness   Modality__________________                Total 30    Blank areas are intentional and mean the treatment did not include these items.       Cyndi Wallace, PT  11/29/2019

## 2021-06-03 NOTE — PROGRESS NOTES
[facility] Daily Progress     Patient Name: Deangelo Leahy  Date: 12/3/2019  Visit #: 20/24 updated order  Referral Diagnosis: s/p L ORIF  Referring provider: Sujatha Ly PA-C  Visit Diagnosis:     ICD-10-CM    1. S/P ORIF (open reduction internal fixation) fracture Z98.890     Z87.81    2. Tibial plateau fracture, left S82.142A        Assessment:     Today patient returns for follow up visit, week 14 after surgery and demonstrates stable knee motion today, 0 deg from flexion after treatment today, 124 flexion. Patient is WBAT, he had been able to walk without crutches for shorter distances and coming into PT without assistive device. Patient having a little more soreness today and over the last week.     Patient now WBAT and is progressing in therapy with improved gait pattern and increased LLE strength.    From Eval:  Patient is a 34 y.o. male that presents with signs and symptoms consistent with left knee pain secondary to definitive ORIF for severe depression of lateral tibial plateau and bicondylar involvement on 8/30/19. Patient demonstrates impairments including decreased knee range of motion, functional strength and joint mobility, with TTWB and crutches with ambulation leading to impaired functional mobility. Patient's functional limitations include walking or standing for longer periods of time, sit to stand transfers and performing generalized daily activity due to WB restriction, brace and crutches.      HEP/POC compliance is  good .  Patient demonstrates understanding/independence with home program.  Patient is appropriate to continue with skilled physical therapy intervention, as indicated by initial plan of care.    Goal Status: Ongoing  Pt. will be independent with home exercise program in : 6 weeks  Pt. will be able to walk : 20 minutes;with PWB;on uneven/inclined surfaces;with less pain;with less difficulty;for community mobility;in 12 weeks  Patient will stand : 10  minutes;with no pain;with less difficultty;for home chores;for work;in 12 weeks  Patient will ascend / descend: stairs;with railing;with recipricol gait;with no pain;with less difficulty;in 12 weeks    Pt will: demonstrate full range of motion of left knee extension and flexion by 12 weeks.       Plan / Patient Education:     Continue with initial plan of care.  Progress with home program as tolerated.    Plan for next visit: review HEP, wall slides, standing hip abd, SL squatting on RLE, ankle strengthening NWB, seated gastroc stretch, HF stretch at edge of bed     Subjective:     Patient feeling more swelling over the last week.     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 duty work has to get dr to sign off on that    Objective:       Patient started with 80deg knee flexion and 18 deg lacking of extension  11/29/19: L flexion 125, L extension 1-2 deg lacking   Gait pattern: shortened step length with slight limp on LLE, lack of terminal extension of LLE  Patient demonstrating 5/5 strength in LLE with hip flexion, knee extension, ankle DF, 4/5 knee flexion  Still demonstrates fatigue and leg weakness with function and ambulation     Treatment Today       Patient Education:  -EDU on swelling and icing throughout the day, need for compression socks      Exercises:  Exercise #1: supine quad set - hold 5 sec x 10-20  Comment #1: SAQ - hold 5 sec x 10-20  Exercise #2: supine or seated heel slides - hold 5 sec x 10-20  Comment #2: wall slides - 10 reps - not to HEP  Exercise #3: seated hamstring stretch - hold 30 sec   Comment #3: standing gastroc/soleus stretch - hold 30 sec  Exercise #4: standing hip abd on L - 10 reps  Comment #4: sidelying hip abduction - 10 reps - bilaterally   Exercise #5: sidelying clamshells - 15 reps  Comment #5: supine HF stretch - hold 30 sec   Exercise #6: prone hamstring curls - hold 5 sec x 10  Comment #6: TKE - 10 reps green  band  Exercise #7: bridge with hip adduction - 10 reps x 2 sets   Comment #7: SLR - 10  Exercise #8: squats - add to HEP  Comment #8: heel-toe raises - add to HEP  Exercise #9: SLS - hold 10 sec, add to HEP  Comment #9: total gym - 20 reps level 26, SL squat x 10 level 23  Exercise #10: lateral step downs without step to start with x 10   Comment #10: lunge and gait training push off lunge - 10 reps  Exercise #11: sidestepping x 10        TREATMENT MINUTES COMMENTS   Evaluation     Self-care/ Home management     Manual therapy 15 Anterior and posterior L knee mobilizations in supine with knee bent to 70 - not today  Prone knee hangs - 5 min with mobilizations and MFR - today  Supine MFR of L medial hamstring with patellar mobilizations and posterior mobilizations - not today   Neuromuscular Re-education Not today NMES 15 min on L quad, 2 electrodes, 10s on 10 off, used with quad set, progressed to SAQ, 46mz   Therapeutic Activity     Therapeutic Exercises 10 See above flowsheet  Treadmill 2 minutes for normal ambulation   Ktape to anterior knee   Gait training  Worked on WBAT and gait training in parallel bars, weight shifting forwards/back - unable to work on appropriately without weakness   Modality__________________                Total 25    Blank areas are intentional and mean the treatment did not include these items.       Cyndi Wallace, PT  12/3/2019

## 2021-06-03 NOTE — PROGRESS NOTES
[facility] Daily Progress     Patient Name: Deangelo Leahy  Date: 11/15/2019  Visit #: 15/24 updated order  Referral Diagnosis: s/p L ORIF  Referring provider: Sujatha Ly PA-C  Visit Diagnosis:     ICD-10-CM    1. S/P ORIF (open reduction internal fixation) fracture Z98.890     Z87.81    2. Tibial plateau fracture, left S82.142A        Assessment:     Today patient returns for follow up visit, week 11 after surgery and demonstrates stable knee motion today, 0 deg from flexion after treatment today, 125 flexion. Patient is WBAT, he had been able to walk without crutches for shorter distances, he had been able to walk around the house without increased symptoms or soreness. Patient now WBAT, however cannot tolerate SEC ambulation due to weakness, educated on how to ambulate with a single crutch.    From Eval:  Patient is a 34 y.o. male that presents with signs and symptoms consistent with left knee pain secondary to definitive ORIF for severe depression of lateral tibial plateau and bicondylar involvement on 8/30/19. Patient demonstrates impairments including decreased knee range of motion, functional strength and joint mobility, with TTWB and crutches with ambulation leading to impaired functional mobility. Patient's functional limitations include walking or standing for longer periods of time, sit to stand transfers and performing generalized daily activity due to WB restriction, brace and crutches.      HEP/POC compliance is  good .  Patient demonstrates understanding/independence with home program.  Patient is appropriate to continue with skilled physical therapy intervention, as indicated by initial plan of care.    Goal Status: Ongoing  Pt. will be independent with home exercise program in : 6 weeks  Pt. will be able to walk : 20 minutes;with PWB;on uneven/inclined surfaces;with less pain;with less difficulty;for community mobility;in 12 weeks  Patient will stand : 10 minutes;with no  pain;with less difficultty;for home chores;for work;in 12 weeks  Patient will ascend / descend: stairs;with railing;with recipricol gait;with no pain;with less difficulty;in 12 weeks    Pt will: demonstrate full range of motion of left knee extension and flexion by 12 weeks.       Plan / Patient Education:     Continue with initial plan of care.  Progress with home program as tolerated.    Plan for next visit: review HEP, wall slides, standing hip abd, SL squatting on RLE, ankle strengthening NWB, seated gastroc stretch, HF stretch at edge of bed     Subjective:     Patient feeling some soreness, feeling like he is working the muscles but good being able to do some limited walking without crutches and even using one crutch.     Patient did go to surgeons PA and no new concerns. Compression sock is needed, Patient without the brace today. Xray is showing it is healing.    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 duty work has to get dr to sign off on that    Objective:     Knee ROM      Within normal limits unless otherwise indicated         AROM in degrees  Right   Left  Right   Left  Right   Left       Knee Flexion  (130 )   80                      Knee Extension  (0 )   18      2              PROM in degrees  Right   Left  Right   Left  Right   Left       Knee Flexion  (130 )   86      118                  Treatment Today       Patient Education:  -EDU on swelling and icing throughout the day, need for compression socks      Exercises:  Exercise #1: supine quad set - hold 5 sec x 10-20  Comment #1: SAQ - hold 5 sec x 10-20  Exercise #2: supine or seated heel slides - hold 5 sec x 10-20  Comment #2: wall slides - 10 reps - not to HEP  Exercise #3: seated hamstring stretch - hold 30 sec   Comment #3: standing gastroc/soleus stretch - hold 30 sec  Exercise #4: standing hip abd on L - 10 reps  Comment #4: sidelying hip abduction - 10 reps  Exercise #5: sidelying  clamshells - 15 reps  Comment #5: supine HF stretch - hold 30 sec   Exercise #6: prone hamstring curls - hold 5 sec x 10  Comment #6: TKE - 10 reps green band  Exercise #7: bridge with hip adduction - 10 reps x 2 sets   Comment #7: SLR - add to HEP   Exercise #8: squats - add to HEP  Comment #8: heel-toe raises - add to HEP  Exercise #9: SLS - hold 10 sec, add to HEP  Comment #9: total gym - 20 reps level 25, SL squat x 10 level 21  Exercise #10: lateral step downs without step to start with x 10         TREATMENT MINUTES COMMENTS   Evaluation     Self-care/ Home management     Manual therapy 15 Anterior and posterior L knee mobilizations in supine with knee bent to 70 - not today  Prone knee hangs - 5 min with mobilizations - was able to get to 0 deg  MFR of L medial hamstring in prone    Neuromuscular Re-education Not today NMES 15 min on L quad, 2 electrodes, 10s on 10 off, used with quad set, progressed to SAQ, 46mz   Therapeutic Activity     Therapeutic Exercises 10 See above flowsheet  trialed upright bike - 3 min, able to perform full revolutions  At seat height progressed to 3   Gait training 5 Worked on WBAT and gait training in parallel bars, weight shifting forwards/back, cane training - unable to work on appropriately without weakness   Modality__________________                Total 30    Blank areas are intentional and mean the treatment did not include these items.       Cyndi Wallace, PT  11/15/2019

## 2021-06-03 NOTE — PROGRESS NOTES
[facility] Daily Progress     Patient Name: Deangelo Leahy  Date: 11/12/2019  Visit #: 14/24 updated order  Referral Diagnosis: s/p L ORIF  Referring provider: Sujatha Ly PA-C  Visit Diagnosis:     ICD-10-CM    1. S/P ORIF (open reduction internal fixation) fracture Z98.890     Z87.81    2. Tibial plateau fracture, left S82.142A        Assessment:     Today patient returns for follow up visit, week 11 after surgery and demonstrates stable knee motion today, 2-3deg from flexion after treatment today, 125 flexion. Patient is WBAT, he had been able to walk without crutches for shorter distances, he had been able to walk around the house without increased symptoms or soreness. Patient now WBAT, however cannot tolerate SEC ambulation due to weakness, educated on how to ambulate with a single crutch.    From Eval:  Patient is a 34 y.o. male that presents with signs and symptoms consistent with left knee pain secondary to definitive ORIF for severe depression of lateral tibial plateau and bicondylar involvement on 8/30/19. Patient demonstrates impairments including decreased knee range of motion, functional strength and joint mobility, with TTWB and crutches with ambulation leading to impaired functional mobility. Patient's functional limitations include walking or standing for longer periods of time, sit to stand transfers and performing generalized daily activity due to WB restriction, brace and crutches.      HEP/POC compliance is  good .  Patient demonstrates understanding/independence with home program.  Patient is appropriate to continue with skilled physical therapy intervention, as indicated by initial plan of care.    Goal Status: Ongoing  Pt. will be independent with home exercise program in : 6 weeks  Pt. will be able to walk : 20 minutes;with PWB;on uneven/inclined surfaces;with less pain;with less difficulty;for community mobility;in 12 weeks  Patient will stand : 10 minutes;with no  pain;with less difficultty;for home chores;for work;in 12 weeks  Patient will ascend / descend: stairs;with railing;with recipricol gait;with no pain;with less difficulty;in 12 weeks    Pt will: demonstrate full range of motion of left knee extension and flexion by 12 weeks.       Plan / Patient Education:     Continue with initial plan of care.  Progress with home program as tolerated.    Plan for next visit: review HEP, wall slides, standing hip abd, SL squatting on RLE, ankle strengthening NWB, seated gastroc stretch, HF stretch at edge of bed     Subjective:     Patient can be WBAT, been able to ambulate a few days with no need for crutches. Swelling has been pretty good, knee itself is a little inflamed still, but foot is better, patient knows he should wear compression socks a little more than he should. Patient is on same work restriction.     Patient did go to surgeons PA and no new concerns. Compression sock is needed, Patient without the brace today. Xray is showing it is healing.    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 duty work has to get dr to sign off on that    Objective:     Knee ROM      Within normal limits unless otherwise indicated         AROM in degrees  Right   Left  Right   Left  Right   Left       Knee Flexion  (130 )   80                      Knee Extension  (0 )   18      2              PROM in degrees  Right   Left  Right   Left  Right   Left       Knee Flexion  (130 )   86      118                  Treatment Today       Patient Education:  -EDU on swelling and icing throughout the day, need for compression socks      Exercises:  Exercise #1: supine quad set - hold 5 sec x 10-20  Comment #1: SAQ - hold 5 sec x 10-20  Exercise #2: supine or seated heel slides - hold 5 sec x 10-20  Comment #2: wall slides - 10 reps - not to HEP  Exercise #3: seated hamstring stretch - hold 30 sec   Comment #3: standing gastroc/soleus stretch - hold 30  sec  Exercise #4: standing hip abd on L - 10 reps  Comment #4: sidelying hip abduction - 10 reps  Exercise #5: sidelying clamshells - 15 reps  Comment #5: supine HF stretch - hold 30 sec   Exercise #6: prone hamstring curls - hold 5 sec x 10  Comment #6: TKE - 10 reps green band  Exercise #7: bridge with hip adduction - 10 reps x 2 sets   Comment #7: SLR - add to HEP   Exercise #8: squats - add to HEP  Comment #8: heel-toe raises - add to HEP  Exercise #9: SLS - hold 10 sec, add to HEP  Comment #9: total gym - 20 reps level 25, SL squat x 10 level 19        TREATMENT MINUTES COMMENTS   Evaluation     Self-care/ Home management     Manual therapy 10 Anterior and posterior L knee mobilizations in supine with knee bent to 70 - today  Prone knee hangs - 5 min with mobilizations - not today  MFR of L medial hamstring in prone    Neuromuscular Re-education Not today NMES 15 min on L quad, 2 electrodes, 10s on 10 off, used with quad set, progressed to SAQ, 46mz   Therapeutic Activity     Therapeutic Exercises 10 See above flowsheet  trialed upright bike - 3 min, able to perform full revolutions  At seat height progressed to 4   Gait training 5 Worked on WBAT and gait training in parallel bars, weight shifting forwards/back, cane training - unable to work on appropriately without weakness   Modality__________________                Total 25    Blank areas are intentional and mean the treatment did not include these items.       Cyndi Wallace, PT  11/12/2019

## 2021-06-04 NOTE — PROGRESS NOTES
[facility] Daily Progress     Patient Name: Deangelo Leahy  Date: 12/31/2019  Visit #: 22/24 updated order  Referral Diagnosis: s/p L ORIF  Referring provider: Sujatha Ly PA-C  Visit Diagnosis:     ICD-10-CM    1. S/P ORIF (open reduction internal fixation) fracture Z98.890     Z87.81    2. Tibial plateau fracture, left S82.142A        Assessment:     Today patient returns for follow up visit and demonstrates stable knee motion today, 0 deg from flexion after treatment today, 124 flexion. Patient is WBAT, he had been able to walk without crutches for shorter distances and coming into PT without assistive device. Patient having a little more soreness today and over the last week.     Patient now WBAT and is progressing in therapy with improved gait pattern and increased LLE strength.    From Eval:  Patient is a 34 y.o. male that presents with signs and symptoms consistent with left knee pain secondary to definitive ORIF for severe depression of lateral tibial plateau and bicondylar involvement on 8/30/19. Patient demonstrates impairments including decreased knee range of motion, functional strength and joint mobility, with TTWB and crutches with ambulation leading to impaired functional mobility. Patient's functional limitations include walking or standing for longer periods of time, sit to stand transfers and performing generalized daily activity due to WB restriction, brace and crutches.      HEP/POC compliance is  good .  Patient demonstrates understanding/independence with home program.  Patient is appropriate to continue with skilled physical therapy intervention, as indicated by initial plan of care.    Goal Status: Ongoing  No data recorded    No data recorded      Plan / Patient Education:     Continue with initial plan of care.  Progress with home program as tolerated.    Plan for next visit: review HEP, wall slides, standing hip abd, SL squatting on RLE, ankle strengthening NWB,  "seated gastroc stretch, HF stretch at edge of bed     Subjective:     Patient feeling pretty good, some soreness after increased walking, however much better. Still working on gait     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 duty work has to get dr to sign off on that    Objective:       Patient started with 80deg knee flexion and 18 deg lacking of extension  11/29/19: L flexion 125, L extension 1-2 deg lacking   Gait pattern: shortened step length with slight limp on LLE, lack of terminal extension of LLE  Patient demonstrating 5/5 strength in LLE with hip flexion, knee extension, ankle DF, 4/5 knee flexion  Still demonstrates fatigue and leg weakness with function and ambulation     Treatment Today       Patient Education:  -EDU on swelling and icing throughout the day, need for compression socks      Exercises:  Comment #1: SAQ - hold 5 sec x 10-20 - added 5lb weight   Comment #2: wall slides - 10 reps - not to HEP  Exercise #4: standing hip abd on L - 10 reps  Comment #4: sidelying hip abduction - 10 reps - bilaterally   Exercise #5: sidelying clamshells - 15 reps  Comment #5: supine HF stretch - hold 30 sec   Exercise #6: prone hamstring curls - hold 5 sec x 10  Comment #6: TKE - 10 reps green band  Exercise #7: bridge with hip adduction - 10 reps x 2 sets   Comment #7: SLR - 10  with 5lb weight   Exercise #8: squats - add to HEP  Comment #8: heel-toe raises - add to HEP  Exercise #9: SLS - hold 10 sec, add to HEP  Comment #9: total gym - 20 reps level 26, SL squat x 10 level 23  Exercise #10: lateral step downs without step to start with x 10   Comment #10: lunge and gait training push off lunge - 10 reps  Exercise #11: sidestepping x 10  Comment #11: step ups - 10 reps 8\"         TREATMENT MINUTES COMMENTS   Evaluation     Self-care/ Home management     Manual therapy 10 Anterior and posterior L knee mobilizations in supine with knee bent to 70 - not " today  Prone knee hangs - 5 min with mobilizations and MFR - today  Supine MFR of L medial hamstring with patellar mobilizations and posterior mobilizations - not today   Neuromuscular Re-education Not today NMES 15 min on L quad, 2 electrodes, 10s on 10 off, used with quad set, progressed to SAQ, 46mz   Therapeutic Activity     Therapeutic Exercises 3 See above flowsheet  Treadmill 3 minutes for normal ambulation   Ktape to anterior knee   Gait training 10 Worked on WBAT and gait training in parallel bars, weight shifting forwards/back    Modality__________________                Total 23    Blank areas are intentional and mean the treatment did not include these items.       Cyndi Wallace, PT  12/31/2019

## 2021-06-04 NOTE — PROGRESS NOTES
[facility] Daily Progress     Patient Name: Deangelo Leahy  Date: 12/17/2019  Visit #: 21/24 updated order  Referral Diagnosis: s/p L ORIF  Referring provider: Sujatha Ly PA-C  Visit Diagnosis:     ICD-10-CM    1. S/P ORIF (open reduction internal fixation) fracture Z98.890     Z87.81    2. Tibial plateau fracture, left S82.142A        Assessment:     Today patient returns for follow up visit, week 16 after surgery and demonstrates stable knee motion today, 0 deg from flexion after treatment today, 124 flexion. Patient is WBAT, he had been able to walk without crutches for shorter distances and coming into PT without assistive device. Patient having a little more soreness today and over the last week.     Patient now WBAT and is progressing in therapy with improved gait pattern and increased LLE strength.    From Eval:  Patient is a 34 y.o. male that presents with signs and symptoms consistent with left knee pain secondary to definitive ORIF for severe depression of lateral tibial plateau and bicondylar involvement on 8/30/19. Patient demonstrates impairments including decreased knee range of motion, functional strength and joint mobility, with TTWB and crutches with ambulation leading to impaired functional mobility. Patient's functional limitations include walking or standing for longer periods of time, sit to stand transfers and performing generalized daily activity due to WB restriction, brace and crutches.      HEP/POC compliance is  good .  Patient demonstrates understanding/independence with home program.  Patient is appropriate to continue with skilled physical therapy intervention, as indicated by initial plan of care.    Goal Status: Ongoing  Pt. will be independent with home exercise program in : 6 weeks  Pt. will be able to walk : 20 minutes;with PWB;on uneven/inclined surfaces;with less pain;with less difficulty;for community mobility;in 12 weeks  Patient will stand : 10  minutes;with no pain;with less difficultty;for home chores;for work;in 12 weeks  Patient will ascend / descend: stairs;with railing;with recipricol gait;with no pain;with less difficulty;in 12 weeks    Pt will: demonstrate full range of motion of left knee extension and flexion by 12 weeks.       Plan / Patient Education:     Continue with initial plan of care.  Progress with home program as tolerated.    Plan for next visit: review HEP, wall slides, standing hip abd, SL squatting on RLE, ankle strengthening NWB, seated gastroc stretch, HF stretch at edge of bed     Subjective:     Patient missed his appt last week, He thinks it mostly has been better. He feels like he is trying to keep the gait correct more than anything, because he feels like it is weaker. Work has been going good, he is out and about more, so spending longer in the car and more sitting. Still doing light duty but doing some stuff.  Patient did have a day that he fell, no increase of symptoms.     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 duty work has to get dr to sign off on that    Objective:       Patient started with 80deg knee flexion and 18 deg lacking of extension  11/29/19: L flexion 125, L extension 1-2 deg lacking   Gait pattern: shortened step length with slight limp on LLE, lack of terminal extension of LLE  Patient demonstrating 5/5 strength in LLE with hip flexion, knee extension, ankle DF, 4/5 knee flexion  Still demonstrates fatigue and leg weakness with function and ambulation     Treatment Today       Patient Education:  -EDU on swelling and icing throughout the day, need for compression socks      Exercises:  Exercise #1: supine quad set - hold 5 sec x 10-20  Comment #1: SAQ - hold 5 sec x 10-20 - added 5lb weight   Exercise #2: supine or seated heel slides - hold 5 sec x 10-20  Comment #2: wall slides - 10 reps - not to HEP  Exercise #3: seated hamstring stretch - hold 30 sec  "  Comment #3: standing gastroc/soleus stretch - hold 30 sec  Exercise #4: standing hip abd on L - 10 reps  Comment #4: sidelying hip abduction - 10 reps - bilaterally   Exercise #5: sidelying clamshells - 15 reps  Comment #5: supine HF stretch - hold 30 sec   Exercise #6: prone hamstring curls - hold 5 sec x 10  Comment #6: TKE - 10 reps green band  Exercise #7: bridge with hip adduction - 10 reps x 2 sets   Comment #7: SLR - 10  with 5lb weight   Exercise #8: squats - add to HEP  Comment #8: heel-toe raises - add to HEP  Exercise #9: SLS - hold 10 sec, add to HEP  Comment #9: total gym - 20 reps level 26, SL squat x 10 level 23  Exercise #10: lateral step downs without step to start with x 10   Comment #10: lunge and gait training push off lunge - 10 reps  Exercise #11: sidestepping x 10  Comment #11: step ups - 10 reps 8\"         TREATMENT MINUTES COMMENTS   Evaluation     Self-care/ Home management     Manual therapy  Anterior and posterior L knee mobilizations in supine with knee bent to 70 - not today  Prone knee hangs - 5 min with mobilizations and MFR - today  Supine MFR of L medial hamstring with patellar mobilizations and posterior mobilizations - not today   Neuromuscular Re-education Not today NMES 15 min on L quad, 2 electrodes, 10s on 10 off, used with quad set, progressed to SAQ, 46mz   Therapeutic Activity     Therapeutic Exercises 25 See above flowsheet  Treadmill 2 minutes for normal ambulation   Ktape to anterior knee   Gait training  Worked on WBAT and gait training in parallel bars, weight shifting forwards/back - unable to work on appropriately without weakness   Modality__________________                Total 25    Blank areas are intentional and mean the treatment did not include these items.       Cyndi Wallace, PT  12/17/2019  "

## 2021-06-05 NOTE — PROGRESS NOTES
[facility] Daily Progress     Patient Name: Deangelo Leahy  Date: 1/14/2020  Visit #: 23/24 updated order  Referral Diagnosis: s/p L ORIF  Referring provider: Sujatha Ly PA-C  Visit Diagnosis:     ICD-10-CM    1. S/P ORIF (open reduction internal fixation) fracture Z98.890     Z87.81    2. Tibial plateau fracture, left S82.142A        Assessment:     Today patient returns hasn't been seen in 2 weeks and reports he did have a work injury on the 7th, tripped and fractured his nose with a mild concussion. Hasn't been doing many exercises but hasn't really noticed much of a difference.     Patient now WBAT and is progressing in therapy with improved gait pattern and increased LLE strength.    From Eval:  Patient is a 34 y.o. male that presents with signs and symptoms consistent with left knee pain secondary to definitive ORIF for severe depression of lateral tibial plateau and bicondylar involvement on 8/30/19. Patient demonstrates impairments including decreased knee range of motion, functional strength and joint mobility, with TTWB and crutches with ambulation leading to impaired functional mobility. Patient's functional limitations include walking or standing for longer periods of time, sit to stand transfers and performing generalized daily activity due to WB restriction, brace and crutches.      HEP/POC compliance is  good .  Patient demonstrates understanding/independence with home program.  Patient is appropriate to continue with skilled physical therapy intervention, as indicated by initial plan of care.    Goal Status: Ongoing  Pt. will be independent with home exercise program in : 6 weeks  Pt. will be able to walk : 20 minutes;with PWB;on uneven/inclined surfaces;with less pain;with less difficulty;for community mobility;in 12 weeks  Patient will stand : 10 minutes;with no pain;with less difficultty;for home chores;for work;in 12 weeks  Patient will ascend / descend: stairs;with  railing;with recipricol gait;with no pain;with less difficulty;in 12 weeks  Pt will: demonstrate full range of motion of left knee extension and flexion by 12 weeks.        Plan / Patient Education:     Continue with initial plan of care.  Progress with home program as tolerated.    Plan for next visit: review HEP, wall slides, standing hip abd, SL squatting on RLE, ankle strengthening NWB, seated gastroc stretch, HF stretch at edge of bed     Subjective:     Patient still feeling pretty weak in his glutes with climbing and stairs, motion has been better.     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 duty work has to get dr to sign off on that    Objective:       Patient started with 80deg knee flexion and 18 deg lacking of extension  11/29/19: L flexion 125, L extension 1-2 deg lacking   Gait pattern: shortened step length with slight limp on LLE, lack of terminal extension of LLE  Patient demonstrating 5/5 strength in LLE with hip flexion, knee extension, ankle DF, 4/5 knee flexion  Still demonstrates fatigue and leg weakness with function and ambulation     Treatment Today       Patient Education:  -EDU on swelling and icing throughout the day, need for compression socks      Exercises:  Comment #1: SAQ - hold 5 sec x 10-20 - added 5lb weight   Comment #2: wall slides - 10 reps - not to HEP  Exercise #4: standing hip abd on L - 10 reps  Comment #4: sidelying hip abduction - 10 reps - bilaterally   Exercise #5: sidelying clamshells - 15 reps  Comment #5: supine HF stretch - hold 30 sec   Exercise #6: prone hamstring curls - hold 5 sec x 10  Comment #6: TKE - 10 reps green band  Exercise #7: bridge with hip adduction - 10 reps x 2 sets   Comment #7: SLR - 10  with 5lb weight   Exercise #8: squats - add to HEP  Comment #8: heel-toe raises - add to HEP  Exercise #9: SLS - hold 10 sec, add to HEP  Comment #9: total gym - 20 reps level 26, SL squat x 10 level  "23  Exercise #10: lateral step downs without step to start with x 10   Comment #10: lunge and gait training push off lunge - 10 reps  Exercise #11: sidestepping x 10  Comment #11: step ups - 10 reps 8\"   Exercise #12: SL deadlift - 10 reps         TREATMENT MINUTES COMMENTS   Evaluation     Self-care/ Home management     Manual therapy  Anterior and posterior L knee mobilizations in supine with knee bent to 70 - not today  Prone knee hangs - 5 min with mobilizations and MFR - today  Supine MFR of L medial hamstring with patellar mobilizations and posterior mobilizations - not today   Neuromuscular Re-education Not today NMES 15 min on L quad, 2 electrodes, 10s on 10 off, used with quad set, progressed to SAQ, 46mz   Therapeutic Activity     Therapeutic Exercises 23 See above flowsheet  Treadmill 3 minutes for normal ambulation   Ktape to anterior knee  Patient trialed jumping   Gait training  Worked on WBAT and gait training in parallel bars, weight shifting forwards/back    Modality__________________                Total 23    Blank areas are intentional and mean the treatment did not include these items.       Cyndi Wallace, PT  1/14/2020  "

## 2021-06-05 NOTE — PROGRESS NOTES
[facility] Daily Progress     Patient Name: Deangelo Leahy  Date: 1/28/2020  Visit #: 24/24 updated order  Referral Diagnosis: s/p L ORIF  Referring provider: Sujatha Ly PA-C  Visit Diagnosis:     ICD-10-CM    1. S/P ORIF (open reduction internal fixation) fracture Z98.890     Z87.81    2. Tibial plateau fracture, left S82.142A        Assessment:     Today patient returns hasn't been seen in 2 weeks and patient reports he has most difficulty with endurance and fatigue. Hasn't been doing many exercises but hasn't really noticed much of a difference. Would benefit from trying mobility exercises and playing sports as needed.    Patient now WBAT and is progressing in therapy with improved gait pattern and increased LLE strength.    From Eval:  Patient is a 34 y.o. male that presents with signs and symptoms consistent with left knee pain secondary to definitive ORIF for severe depression of lateral tibial plateau and bicondylar involvement on 8/30/19. Patient demonstrates impairments including decreased knee range of motion, functional strength and joint mobility, with TTWB and crutches with ambulation leading to impaired functional mobility. Patient's functional limitations include walking or standing for longer periods of time, sit to stand transfers and performing generalized daily activity due to WB restriction, brace and crutches.      HEP/POC compliance is  good .  Patient demonstrates understanding/independence with home program.  Patient is appropriate to continue with skilled physical therapy intervention, as indicated by initial plan of care.    Goal Status: Ongoing  Pt. will be independent with home exercise program in : 6 weeks  Pt. will be able to walk : 20 minutes;with PWB;on uneven/inclined surfaces;with less pain;with less difficulty;for community mobility;in 12 weeks  Patient will stand : 10 minutes;with no pain;with less difficultty;for home chores;for work;in 12 weeks  Patient  will ascend / descend: stairs;with railing;with recipricol gait;with no pain;with less difficulty;in 12 weeks  Pt will: demonstrate full range of motion of left knee extension and flexion by 12 weeks.        Plan / Patient Education:     Continue with initial plan of care.  Progress with home program as tolerated.    Plan for next visit: review HEP, wall slides, standing hip abd, SL squatting on RLE, ankle strengthening NWB, seated gastroc stretch, HF stretch at edge of bed     Subjective:     Patient still feeling pretty weak in his glutes with climbing and stairs, motion has been better. Walking for longer periods of time bothers him most.     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 duty work has to get dr to sign off on that    Objective:       Patient started with 80deg knee flexion and 18 deg lacking of extension  11/29/19: L flexion 125, L extension 1-2 deg lacking   Gait pattern: shortened step length with slight limp on LLE, lack of terminal extension of LLE  Patient demonstrating 5/5 strength in LLE with hip flexion, knee extension, ankle DF, 4/5 knee flexion  Still demonstrates fatigue and leg weakness with function and ambulation     Treatment Today       Patient Education:  -EDU on swelling and icing throughout the day, need for compression socks      Exercises:  Comment #1: SAQ - hold 5 sec x 10-20 - added 5lb weight   Comment #2: wall slides - 10 reps - not to HEP  Exercise #4: standing hip abd on L - 10 reps  Comment #4: sidelying hip abduction - 10 reps - bilaterally   Comment #6: TKE - 10 reps green band  Exercise #7: bridge with hip adduction - 10 reps x 2 sets   Comment #7: SLR - 10  with 5lb weight   Exercise #8: squats - add to HEP  Comment #8: heel-toe raises - add to HEP  Exercise #9: SLS - hold 10 sec, add to HEP  Comment #9: total gym - 20 reps level 26, SL squat x 10 level 23  Exercise #10: lateral step downs with 8in step x 10   Comment  "#10: lunge and gait training push off lunge - 10 reps  Exercise #11: sidestepping x 10  Comment #11: step ups - 10 reps 8\"   Exercise #12: SL deadlift - 10 reps         TREATMENT MINUTES COMMENTS   Evaluation     Self-care/ Home management     Manual therapy  Anterior and posterior L knee mobilizations in supine with knee bent to 70 - not today  Prone knee hangs - 5 min with mobilizations and MFR - today  Supine MFR of L medial hamstring with patellar mobilizations and posterior mobilizations - not today   Neuromuscular Re-education Not today NMES 15 min on L quad, 2 electrodes, 10s on 10 off, used with quad set, progressed to SAQ, 46mz   Therapeutic Activity     Therapeutic Exercises 25 See above flowsheet  Treadmill 3 minutes for normal ambulation   Ktape to anterior knee  Patient trialed jumping   Gait training  Worked on WBAT and gait training in parallel bars, weight shifting forwards/back    Modality__________________                Total 25    Blank areas are intentional and mean the treatment did not include these items.       Cyndi Wallace, PT  1/28/2020  "

## 2021-06-06 NOTE — PROGRESS NOTES
[facility] Discharge Summary  Patient Name: Deangelo Leahy  Date: 3/17/2020  Referral Diagnosis: s/p L ORIF  Referring provider: Sujatha Ly PA-C  Visit Diagnosis:   1. S/P ORIF (open reduction internal fixation) fracture         Goals:  Pt. will be independent with home exercise program in : 6 weeks  Pt. will be able to walk : 20 minutes;with PWB;on uneven/inclined surfaces;with less pain;with less difficulty;for community mobility;in 12 weeks  Patient will stand : 10 minutes;with no pain;with less difficultty;for home chores;for work;in 12 weeks  Patient will ascend / descend: stairs;with railing;with recipricol gait;with no pain;with less difficulty;in 12 weeks  Pt will: demonstrate full range of motion of left knee extension and flexion by 12 weeks.     Patient was seen for 25 visits for physical therapy of s/p L ORIF from 9/24/19 to 2/11/20 with no follow up appointments.   The patient met goals and has demonstrated understanding of and independence in the home program for self-care, and progression to next steps.  He will initiate contact if questions or concerns arise.  The patient reports feeling better and did not wish to schedule further therapy at this time.    Therapy will be discontinued at this time.  The patient will need a new referral to resume physical therapy treatment. Please see below for patient's current status.    Thank you for your referral.  Cyndi Wallace, PT, DPT  3/17/2020   8:59 AM      [facility] Daily Progress     Patient Name: Deangelo Leahy  Date: 2/11/2020  Visit #: 25/24 updated order  Referral Diagnosis: s/p L ORIF  Referring provider: Sujatha Ly PA-C  Visit Diagnosis:     ICD-10-CM    1. S/P ORIF (open reduction internal fixation) fracture Z98.890     Z87.81        Assessment:     Today patient returns hasn't been seen in 2 weeks and patient reports he has most difficulty with endurance and fatigue, like climbing a lot of  stairs. Hasn't been doing many exercises but hasn't really noticed much of a difference. Would benefit from trying mobility exercises and playing sports as needed. Patient was educated to climb stairs and to work on his endurance on his own. Patient will trial independence and return if needed.    Patient now WBAT and is progressing in therapy with improved gait pattern and increased LLE strength.    From Eval:  Patient is a 34 y.o. male that presents with signs and symptoms consistent with left knee pain secondary to definitive ORIF for severe depression of lateral tibial plateau and bicondylar involvement on 8/30/19. Patient demonstrates impairments including decreased knee range of motion, functional strength and joint mobility, with TTWB and crutches with ambulation leading to impaired functional mobility. Patient's functional limitations include walking or standing for longer periods of time, sit to stand transfers and performing generalized daily activity due to WB restriction, brace and crutches.      HEP/POC compliance is  good .  Patient demonstrates understanding/independence with home program.  Patient is appropriate to continue with skilled physical therapy intervention, as indicated by initial plan of care.    Goal Status: Ongoing  Pt. will be independent with home exercise program in : 6 weeks  Pt. will be able to walk : 20 minutes;with PWB;on uneven/inclined surfaces;with less pain;with less difficulty;for community mobility;in 12 weeks  Patient will stand : 10 minutes;with no pain;with less difficultty;for home chores;for work;in 12 weeks  Patient will ascend / descend: stairs;with railing;with recipricol gait;with no pain;with less difficulty;in 12 weeks  Pt will: demonstrate full range of motion of left knee extension and flexion by 12 weeks.        Plan / Patient Education:     Continue with initial plan of care.  Progress with home program as tolerated.    Plan for next visit: review HEP, wall  "slides, standing hip abd, SL squatting on RLE, ankle strengthening NWB, seated gastroc stretch, HF stretch at edge of bed     Subjective:     Patient still feeling pretty weak in his glutes with climbing and stairs, motion has been better. Walking for longer periods of time bothers him most and climbing stairs.     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 duty work has to get dr to sign off on that    Objective:       Patient started with 80deg knee flexion and 18 deg lacking of extension  11/29/19: L flexion 125, L extension 1-2 deg lacking   Gait pattern: shortened step length with slight limp on LLE, lack of terminal extension of LLE  Patient demonstrating 5/5 strength in LLE with hip flexion, knee extension, ankle DF, 4/5 knee flexion  Still demonstrates fatigue and leg weakness with function and ambulation     Treatment Today       Patient Education:  -EDU on swelling and icing throughout the day, need for compression socks      Exercises:  Comment #1: SAQ - hold 5 sec x 10-20 - added 5lb weight   Comment #2: wall slides - 10 reps - not to HEP  Exercise #4: standing hip abd on L - 10 reps  Comment #4: sidelying hip abduction - 10 reps - bilaterally   Comment #6: TKE - 10 reps green band  Exercise #7: bridge with hip adduction - 10 reps x 2 sets   Comment #7: SLR - 10  with 5lb weight   Exercise #8: squats - add to HEP  Comment #9: total gym - 20 reps level 26, SL squat x 10 level 23  Exercise #10: lateral step downs with 8in step x 10   Comment #10: lunge and gait training push off lunge - 10 reps  Exercise #11: sidestepping x 10  Comment #11: step ups - 10 reps 8\"   Exercise #12: SL deadlift - 10 reps         TREATMENT MINUTES COMMENTS   Evaluation     Self-care/ Home management     Manual therapy  Anterior and posterior L knee mobilizations in supine with knee bent to 70 - not today  Prone knee hangs - 5 min with mobilizations and MFR - today  Supine MFR of " L medial hamstring with patellar mobilizations and posterior mobilizations - not today   Neuromuscular Re-education Not today NMES 15 min on L quad, 2 electrodes, 10s on 10 off, used with quad set, progressed to SAQ, 46mz   Therapeutic Activity     Therapeutic Exercises 25 See above flowsheet  Treadmill 3 minutes for normal ambulation   Ktape to anterior knee  Patient trialed jumping  Worked on hopping today too - added to HEP   Gait training  Worked on WBAT and gait training in parallel bars, weight shifting forwards/back    Modality__________________                Total 25    Blank areas are intentional and mean the treatment did not include these items.       Cyndi Wallace, PT  2/11/2020

## 2021-08-20 ENCOUNTER — LAB REQUISITION (OUTPATIENT)
Dept: LAB | Facility: CLINIC | Age: 36
End: 2021-08-20
Payer: COMMERCIAL

## 2021-08-20 DIAGNOSIS — Z03.818 ENCOUNTER FOR OBSERVATION FOR SUSPECTED EXPOSURE TO OTHER BIOLOGICAL AGENTS RULED OUT: ICD-10-CM

## 2021-08-20 PROCEDURE — U0005 INFEC AGEN DETEC AMPLI PROBE: HCPCS | Mod: ORL | Performed by: FAMILY MEDICINE

## 2021-08-21 LAB — SARS-COV-2 RNA RESP QL NAA+PROBE: NEGATIVE

## 2023-07-25 ENCOUNTER — HOSPITAL ENCOUNTER (EMERGENCY)
Facility: CLINIC | Age: 38
Discharge: HOME | End: 2023-07-25
Admitting: PHYSICIAN ASSISTANT
Payer: COMMERCIAL

## 2023-07-25 ENCOUNTER — APPOINTMENT (OUTPATIENT)
Dept: RADIOLOGY | Facility: CLINIC | Age: 38
End: 2023-07-25
Attending: PHYSICIAN ASSISTANT
Payer: COMMERCIAL

## 2023-07-25 VITALS
BODY MASS INDEX: 30.06 KG/M2 | HEART RATE: 80 BPM | RESPIRATION RATE: 16 BRPM | SYSTOLIC BLOOD PRESSURE: 121 MMHG | TEMPERATURE: 97.9 F | OXYGEN SATURATION: 100 % | HEIGHT: 70 IN | DIASTOLIC BLOOD PRESSURE: 61 MMHG | WEIGHT: 210 LBS

## 2023-07-25 DIAGNOSIS — L03.90 CELLULITIS: ICD-10-CM

## 2023-07-25 DIAGNOSIS — A69.20 ERYTHEMA MIGRANS (LYME DISEASE): ICD-10-CM

## 2023-07-25 LAB
ALBUMIN SERPL-MCNC: 3.7 G/DL (ref 3.5–5)
ALP SERPL-CCNC: 48 U/L (ref 45–120)
ALT SERPL W P-5'-P-CCNC: 22 U/L (ref 0–45)
ANION GAP SERPL CALCULATED.3IONS-SCNC: 11 MMOL/L (ref 5–18)
AST SERPL W P-5'-P-CCNC: 21 U/L (ref 0–40)
BILIRUB SERPL-MCNC: 0.6 MG/DL (ref 0–1)
BUN SERPL-MCNC: 5 MG/DL (ref 8–22)
CALCIUM SERPL-MCNC: 9.4 MG/DL (ref 8.5–10.5)
CHLORIDE BLD-SCNC: 102 MMOL/L (ref 98–107)
CO2 SERPL-SCNC: 24 MMOL/L (ref 22–31)
CREAT SERPL-MCNC: 0.82 MG/DL (ref 0.7–1.3)
ERYTHROCYTE [DISTWIDTH] IN BLOOD BY AUTOMATED COUNT: 11.6 % (ref 10–15)
GFR SERPL CREATININE-BSD FRML MDRD: >90 ML/MIN/1.73M2
GLUCOSE BLD-MCNC: 92 MG/DL (ref 70–125)
HCT VFR BLD AUTO: 37.9 % (ref 40–53)
HGB BLD-MCNC: 12.9 G/DL (ref 13.3–17.7)
LACTATE SERPL-SCNC: 0.8 MMOL/L (ref 0.7–2)
MCH RBC QN AUTO: 27.8 PG (ref 26.5–33)
MCHC RBC AUTO-ENTMCNC: 34 G/DL (ref 31.5–36.5)
MCV RBC AUTO: 82 FL (ref 78–100)
PLATELET # BLD AUTO: 261 10E3/UL (ref 150–450)
POTASSIUM BLD-SCNC: 4.2 MMOL/L (ref 3.5–5)
PROT SERPL-MCNC: 7.4 G/DL (ref 6–8)
RBC # BLD AUTO: 4.64 10E6/UL (ref 4.4–5.9)
SODIUM SERPL-SCNC: 137 MMOL/L (ref 136–145)
WBC # BLD AUTO: 7.8 10E3/UL (ref 4–11)

## 2023-07-25 PROCEDURE — 86753 PROTOZOA ANTIBODY NOS: CPT | Performed by: PHYSICIAN ASSISTANT

## 2023-07-25 PROCEDURE — 87468 ANAPLSMA PHGCYTOPHLM AMP PRB: CPT | Performed by: PHYSICIAN ASSISTANT

## 2023-07-25 PROCEDURE — 86666 EHRLICHIA ANTIBODY: CPT | Performed by: PHYSICIAN ASSISTANT

## 2023-07-25 PROCEDURE — 85027 COMPLETE CBC AUTOMATED: CPT | Performed by: PHYSICIAN ASSISTANT

## 2023-07-25 PROCEDURE — 99284 EMERGENCY DEPT VISIT MOD MDM: CPT | Mod: 25

## 2023-07-25 PROCEDURE — 80053 COMPREHEN METABOLIC PANEL: CPT | Performed by: PHYSICIAN ASSISTANT

## 2023-07-25 PROCEDURE — 36415 COLL VENOUS BLD VENIPUNCTURE: CPT | Performed by: PHYSICIAN ASSISTANT

## 2023-07-25 PROCEDURE — 83605 ASSAY OF LACTIC ACID: CPT | Performed by: PHYSICIAN ASSISTANT

## 2023-07-25 PROCEDURE — 250N000013 HC RX MED GY IP 250 OP 250 PS 637: Performed by: PHYSICIAN ASSISTANT

## 2023-07-25 PROCEDURE — 86618 LYME DISEASE ANTIBODY: CPT | Performed by: PHYSICIAN ASSISTANT

## 2023-07-25 PROCEDURE — 71046 X-RAY EXAM CHEST 2 VIEWS: CPT

## 2023-07-25 PROCEDURE — 87015 SPECIMEN INFECT AGNT CONCNTJ: CPT | Performed by: PHYSICIAN ASSISTANT

## 2023-07-25 PROCEDURE — 86617 LYME DISEASE ANTIBODY: CPT | Performed by: PHYSICIAN ASSISTANT

## 2023-07-25 PROCEDURE — 87207 SMEAR SPECIAL STAIN: CPT | Performed by: PHYSICIAN ASSISTANT

## 2023-07-25 PROCEDURE — 87798 DETECT AGENT NOS DNA AMP: CPT | Performed by: PHYSICIAN ASSISTANT

## 2023-07-25 RX ORDER — DOXYCYCLINE 100 MG/1
100 CAPSULE ORAL ONCE
Status: COMPLETED | OUTPATIENT
Start: 2023-07-25 | End: 2023-07-25

## 2023-07-25 RX ORDER — IBUPROFEN 600 MG/1
600 TABLET, FILM COATED ORAL ONCE
Status: COMPLETED | OUTPATIENT
Start: 2023-07-25 | End: 2023-07-25

## 2023-07-25 RX ORDER — DOXYCYCLINE 100 MG/1
100 CAPSULE ORAL 2 TIMES DAILY
Qty: 19 CAPSULE | Refills: 0 | Status: SHIPPED | OUTPATIENT
Start: 2023-07-25 | End: 2023-08-04

## 2023-07-25 RX ADMIN — IBUPROFEN 600 MG: 600 TABLET ORAL at 21:04

## 2023-07-25 RX ADMIN — DOXYCYCLINE 100 MG: 100 CAPSULE ORAL at 22:11

## 2023-07-25 ASSESSMENT — ENCOUNTER SYMPTOMS
SHORTNESS OF BREATH: 1
HEADACHES: 1
MYALGIAS: 1
FEVER: 1
SORE THROAT: 0

## 2023-07-25 ASSESSMENT — ACTIVITIES OF DAILY LIVING (ADL): ADLS_ACUITY_SCORE: 35

## 2023-07-25 NOTE — ED TRIAGE NOTES
Pt noticed redness around his left upper chest and shoulder near his neck. He went tot he urgency room on Sunday and they prescribed some antibiotics. Since then the redness has spread. Denies any fevers at home. Was told to come in if it was not getting better.     Triage Assessment       Row Name 07/25/23 1828       Triage Assessment (Adult)    Airway WDL WDL       Respiratory WDL    Respiratory WDL WDL       Skin Circulation/Temperature WDL    Skin Circulation/Temperature WDL X  Redness on the left upper chest and shoulder       Cardiac WDL    Cardiac WDL WDL       Peripheral/Neurovascular WDL    Peripheral Neurovascular WDL WDL       Cognitive/Neuro/Behavioral WDL    Cognitive/Neuro/Behavioral WDL WDL

## 2023-07-26 LAB
ANAPLASMA BLD MOD GIEMSA: NEGATIVE
B BURGDOR IGG SERPL QL IA: 19.5 INDEX
B BURGDOR IGG SERPL QL IA: REACTIVE
B BURGDOR IGG+IGM SER QL: 7.96
B BURGDOR IGM SERPL QL IA: 7.16 INDEX
B BURGDOR IGM SERPL QL IA: REACTIVE
B MICROTI BLD SMEAR: NEGATIVE
EHRLICHIA SPEC QL MICRO: NEGATIVE

## 2023-07-26 NOTE — DISCHARGE INSTRUCTIONS
As we discussed, please continue taking the cephalexin you were previously prescribed.  In addition to this, I will start you on the antibiotic doxycycline.  We have sent your blood testing for tickborne illness and we will call you if any of it returns positive, though I am suspicious for Lyme disease given the change in your rash.  If it anytime you spike a fever, develop worsening redness, swelling, or pain, please return to the ER for further evaluation.  I would like this area rechecked in your clinic in the next 2 days.    *Stop taking magnesium while taking your antibiotics. You may restart it when you complete your antibiotics.

## 2023-07-26 NOTE — ED PROVIDER NOTES
Emergency Department Encounter   NAME: Deangelo Leahy ; AGE: 38 year old male ; YOB: 1985 ; MRN: [id number] ; PCP: Rony Mahan [facility]   ED PROVIDER: Haydee Guidry PA-C    Evaluation Date & Time:   7/25/2023  7:53 PM    CHIEF COMPLAINT:  Cellulitis      Impression and Plan   MDM:   Deangelo Leahy is a 38 year old male with a pertinent history of HTN, asthma, who presents to the ED by walk in for evaluation of cellulitis.  Per chart review, the patient was seen at [facility] emergency room 2 days ago on 7/23 with redness to his left upper chest.  He was diagnosed with a cellulitis from a likely insect bite, and was started on cephalexin 4 times daily for 7 days.  Per patient, he has been taking this as prescribed and today is his third day of the antibiotics, however he noticed some erythema extending towards/outside of the margins, prompting his return to the ER.  Overall, he feels generally improved.  He did initially feel feverish, chills, headache, myalgias though the symptoms have been improving.  Here in the ER, he is afebrile and vitally stable.  No evidence of sepsis.  He is well-appearing and in no acute distress.  Chest wall does have a large area of erythema and warmth with a central lesion, and surrounding clearing that is circular. He assumed he was bitten by an insect though did not see any insect on him. He was doing yard work and spends significant amount of time outside. Given the appearance of this rash, and high prevalence of tick borne illness in the community at this time, highly suspicious for erythema migrans and lyme disease.  Lyme, Ehrlichia, Anaplasma, and babesiosis titers sent and pending.  Basic labs reassuring.  No leukocytosis or elevated lactate to suggest sepsis or worsening bacterial infection.  Chest x-ray obtained and shows no gas, and patient is clinically well, no concern at this time for more sinister pathology such as necrotizing fasciitis.   Patient is on his third day of antibiotics though there has only been a slight increase in the erythema just outside of the margins.  Given this, discussed broadening his antibiotics for MRSA coverage with doxycycline which will additionally cover him for Lyme disease.  Advised him to continue taking the cephalexin as previously prescribed along with his new prescription for doxycycline.  Advised to 2-day reassessment of this rash in his primary care clinic and he was given very strict return precautions.  If erythema continues to worsen, he will need admission for IV antibiotics which I discussed with him.  Patient would like to trial doxycycline at home and will return to the ED with any new or worsening symptoms. Discharged in stable condition.     Medical Decision Making    History:  Supplemental history from: Documented in chart, if applicable  External Record(s) reviewed: Documented in chart, if applicable.    Work Up:  Chart documentation includes differential considered and any EKGs or imaging independently interpreted by provider, where specified.  In additional to work up documented, I considered the following work up: Documented in chart, if applicable.    External consultation:  Discussion of management with another provider: Documented in chart, if applicable    Complicating factors:  Care impacted by chronic illness: N/A  Care affected by social determinants of health: N/A    Disposition considerations: Discharge. I prescribed additional prescription strength medication(s) as charted. See documentation for any additional details.      ED COURSE:  8:23 PM  I met and introduced myself to the patient. I gathered initial history and performed my physical exam. We discussed plan for initial workup.   8:53 PM Took pictures of patients cellulitis for chart  10:12 PM  I rechecked the patient and discussed results, discharge, follow up, and reasons to return to the ED.     At the conclusion of the encounter I  discussed the results of all the tests and the disposition. The questions were answered. The patient or family acknowledged understanding and was agreeable with the care plan.    FINAL IMPRESSION:    ICD-10-CM    1. Cellulitis  L03.90       2. Erythema migrans (Lyme disease)  A69.20             MEDICATIONS GIVEN IN THE EMERGENCY DEPARTMENT:  Medications   ibuprofen (ADVIL/MOTRIN) tablet 600 mg (600 mg Oral $Given 7/25/23 2104)   doxycycline hyclate (VIBRAMYCIN) capsule 100 mg (100 mg Oral $Given 7/25/23 2211)         NEW PRESCRIPTIONS STARTED AT TODAY'S ED VISIT:  New Prescriptions    DOXYCYCLINE HYCLATE (VIBRAMYCIN) 100 MG CAPSULE    Take 1 capsule (100 mg) by mouth 2 times daily for 10 days         HPI   Patient information was obtained from: Patient   Use of Intrepreter: N/A     Deangelo Leahy is a 38 year old male with a pertinent history of HTN, asthma, who presents to the ED by walk in for evaluation of cellulitis.     Per chart review, patient was seen in  on 7/23/2023 for rash on left chest. Exam consistent with cellulitis. Patient discharged on Cephalexin 500mg 4x daily.     Per patient, he got a bug bite near his left chest on 7/18 (7 days ago). He reports having subjective fevers during this time that has since resolved. He was seen in  2 days ago and says his cellulitis has slightly worsened. Patient has been compliant with his Cephalexin. Patient endorses a headache, myalgias, shortness of breath, and headache. Patent denies sore throat, chest pain.     REVIEW OF SYSTEMS:  Review of Systems   Constitutional:  Positive for fever (Resolved).   HENT:  Negative for sore throat.    Respiratory:  Positive for shortness of breath.    Cardiovascular:  Negative for chest pain.   Musculoskeletal:  Positive for myalgias.   Skin:  Positive for rash (Left chest).   Neurological:  Positive for headaches.   All other systems reviewed and are negative.        Medical History     Past Medical History:  "  Diagnosis Date    Anxiety     Asthma     Hypertension     Tibial plateau fracture 08/2019    Uncomplicated asthma        Past Surgical History:   Procedure Laterality Date    OPEN REDUCTION INTERNAL FIXATION TIBIAL PLATEAU Left 8/30/2019    Procedure: OPEN REDUCTION INTERNAL FIXATION LEFT TIBIA PLATEAU;  Surgeon: Nathan Alberto MD;  Location: [facility] OR       No family history on file.    Social History     Tobacco Use    Smoking status: Former   Substance Use Topics    Alcohol use: Yes    Drug use: Never       doxycycline hyclate (VIBRAMYCIN) 100 MG capsule  acetaminophen (TYLENOL) 500 MG tablet  aspirin (ASA) 325 MG EC tablet  hydrOXYzine (ATARAX) 25 MG tablet  magnesium 100 MG TABS  multivitamin w/minerals (MULTI-VITAMIN) tablet  oxyCODONE (ROXICODONE) 5 MG tablet  senna-docusate (SENOKOT-S/PERICOLACE) 8.6-50 MG tablet          Physical Exam     First Vitals:  Patient Vitals for the past 24 hrs:   BP Temp Temp src Pulse Resp SpO2 Height Weight   07/25/23 2050 120/74 -- -- -- -- -- -- --   07/25/23 2030 123/75 -- -- -- -- -- -- --   07/25/23 2020 114/68 -- -- -- -- -- -- --   07/25/23 2000 109/65 -- -- -- -- -- -- --   07/25/23 1955 118/71 -- -- -- -- -- -- --   07/25/23 1827 126/79 97.9  F (36.6  C) Temporal 80 16 100 % 1.778 m (5' 10\") 95.3 kg (210 lb)         PHYSICAL EXAM:   Physical Exam  Vitals and nursing note reviewed.   Constitutional:       General: He is not in acute distress.     Appearance: Normal appearance. He is not ill-appearing or toxic-appearing.   HENT:      Head: Normocephalic.      Mouth/Throat:      Mouth: Mucous membranes are moist.      Pharynx: Oropharynx is clear.   Pulmonary:      Effort: Pulmonary effort is normal.      Breath sounds: Normal breath sounds.   Skin:     Comments: Large circular area of erythema to the left upper chest wall/left lateral neck that is warm to the touch. There is a central scabbed over lesion and surrounding clearing of the rash. No fluctuance or crepitus.  "   Neurological:      Mental Status: He is alert.             Results     LAB:  All pertinent labs reviewed and interpreted  Labs Ordered and Resulted from Time of ED Arrival to Time of ED Departure   CBC WITH PLATELETS - Abnormal       Result Value    WBC Count 7.8      RBC Count 4.64      Hemoglobin 12.9 (*)     Hematocrit 37.9 (*)     MCV 82      MCH 27.8      MCHC 34.0      RDW 11.6      Platelet Count 261     COMPREHENSIVE METABOLIC PANEL - Abnormal    Sodium 137      Potassium 4.2      Chloride 102      Carbon Dioxide (CO2) 24      Anion Gap 11      Urea Nitrogen 5 (*)     Creatinine 0.82      Calcium 9.4      Glucose 92      Alkaline Phosphatase 48      AST 21      ALT 22      Protein Total 7.4      Albumin 3.7      Bilirubin Total 0.6      GFR Estimate >90     LACTIC ACID WHOLE BLOOD - Normal    Lactic Acid 0.8     LYME DISEASE TOTAL ABS BLD WITH REFLEX TO CONFIRM CLIA   BABESIA SPECIES BY PCR   BABESIA ANTIBODY IGG IGM   EHRLICHIA ANAPLASMA SP BY PCR   EHRLICHIA CHAFFEENIS ABYS IGG AND IGM   ANAPLASMA PHAGOCYTOPH ANTIBODY IGG IGM   BLOOD PARASITOLOGY EXAM   BLOOD PARASITOLOGY EXAM       RADIOLOGY:  Chest XR,  PA & LAT   Final Result   IMPRESSION: Negative chest.            I, Sj Mcadams, am serving as a scribe to document services personally performed by Haydee Guidry PA-C, based on my observation and the provider's statements to me. I, Haydee Guidry PA-C attest that Sj Mcadams is acting in a scribe capacity, has observed my performance of the services and has documented them in accordance with my direction.       Haydee Guidry PA-C   Emergency Medicine   [facility] EMERGENCY ROOM       Haydee Guidry PA-C  07/25/23 8537

## 2023-07-28 LAB
A PHAGOCYTOPH DNA BLD QL NAA+PROBE: NOT DETECTED
B MICROTI IGG TITR SER: NORMAL {TITER}
B MICROTI IGM TITR SER: NORMAL {TITER}
E CHAFFEENSIS DNA BLD QL NAA+PROBE: NOT DETECTED
E EWINGII DNA SPEC QL NAA+PROBE: NOT DETECTED
EHRLICHIA DNA SPEC QL NAA+PROBE: NOT DETECTED

## 2023-07-29 LAB
A PHAGOCYTOPH IGG TITR SER IF: NORMAL {TITER}
A PHAGOCYTOPH IGM TITR SER IF: NORMAL {TITER}
B MICROTI DNA BLD QL NAA+PROBE: NOT DETECTED
BABESIA DNA BLD QL NAA+PROBE: NOT DETECTED
E CHAFFEENSIS IGG TITR SER IF: NORMAL {TITER}
E CHAFFEENSIS IGM TITR SER IF: NORMAL {TITER}

## 2023-07-30 ENCOUNTER — HEALTH MAINTENANCE LETTER (OUTPATIENT)
Age: 38
End: 2023-07-30

## 2024-04-22 ENCOUNTER — HOSPITAL ENCOUNTER (OUTPATIENT)
Dept: CT IMAGING | Facility: HOSPITAL | Age: 39
Discharge: HOME | End: 2024-04-22
Attending: ORTHOPAEDIC SURGERY | Admitting: ORTHOPAEDIC SURGERY
Payer: COMMERCIAL

## 2024-04-22 DIAGNOSIS — M25.562 LEFT KNEE PAIN: ICD-10-CM

## 2024-04-22 PROCEDURE — 73700 CT LOWER EXTREMITY W/O DYE: CPT | Mod: LT

## 2024-09-22 ENCOUNTER — HEALTH MAINTENANCE LETTER (OUTPATIENT)
Age: 39
End: 2024-09-22

## (undated) DEVICE — SU VICRYL 2-0 CT-1 27" UND J259H

## (undated) DEVICE — Device

## (undated) DEVICE — BLADE CLIPPER SGL USE 9680

## (undated) DEVICE — DRILL BIT SYN 2.8MM CALIBRATED 324.214

## (undated) DEVICE — CAST PADDING 6" STERILE 9046S

## (undated) DEVICE — SU VICRYL 0 CT-1 36" J346H

## (undated) DEVICE — GLOVE PROTEXIS BLUE W/NEU-THERA 8.0  2D73EB80

## (undated) DEVICE — DRAPE SHEET REV FOLD 3/4 9349

## (undated) DEVICE — DRSG GAUZE 4X4" TRAY

## (undated) DEVICE — SU ETHIBOND 2 V-37 4X30" MX69G

## (undated) DEVICE — PREP CHLORAPREP 26ML TINTED ORANGE  260815

## (undated) DEVICE — DRILL BIT QUICK COUPLING 2.5X110MM GOLD 310.25

## (undated) DEVICE — BNDG ELASTIC 6" DBL LENGTH UNSTERILE 6611-16

## (undated) DEVICE — DRAPE C-ARM 60X42" 1013

## (undated) DEVICE — SU ETHILON 3-0 FS-1 18" 669H

## (undated) DEVICE — IMP SCR SYN CAN 4.0X40MM FT SS 206.040: Type: IMPLANTABLE DEVICE | Site: TIBIA | Status: NON-FUNCTIONAL

## (undated) DEVICE — GLOVE PROTEXIS POWDER FREE 8.0 ORTHOPEDIC 2D73ET80

## (undated) DEVICE — PACK TOTAL KNEE SOP15TKFSD

## (undated) DEVICE — IMP SCR SYN CAN 4.0X60MM FT SS 206.060: Type: IMPLANTABLE DEVICE | Site: TIBIA | Status: NON-FUNCTIONAL

## (undated) DEVICE — SUCTION CANISTER MEDIVAC LINER 3000ML W/LID 65651-530

## (undated) DEVICE — TOURNIQUET CUFF 30" REPRO BLUE 60-7070-105

## (undated) DEVICE — ESU GROUND PAD ADULT W/CORD E7507

## (undated) DEVICE — LINEN TOWEL PACK X5 5464

## (undated) RX ORDER — FENTANYL CITRATE 0.05 MG/ML
INJECTION, SOLUTION INTRAMUSCULAR; INTRAVENOUS
Status: DISPENSED
Start: 2019-08-30

## (undated) RX ORDER — HYDROXYZINE HYDROCHLORIDE 25 MG/1
TABLET, FILM COATED ORAL
Status: DISPENSED
Start: 2019-08-30

## (undated) RX ORDER — HYDROMORPHONE HYDROCHLORIDE 1 MG/ML
INJECTION, SOLUTION INTRAMUSCULAR; INTRAVENOUS; SUBCUTANEOUS
Status: DISPENSED
Start: 2019-08-30

## (undated) RX ORDER — LIDOCAINE HYDROCHLORIDE 20 MG/ML
INJECTION, SOLUTION EPIDURAL; INFILTRATION; INTRACAUDAL; PERINEURAL
Status: DISPENSED
Start: 2019-08-30

## (undated) RX ORDER — CEFAZOLIN SODIUM 2 G/100ML
INJECTION, SOLUTION INTRAVENOUS
Status: DISPENSED
Start: 2019-08-30

## (undated) RX ORDER — PROPOFOL 10 MG/ML
INJECTION, EMULSION INTRAVENOUS
Status: DISPENSED
Start: 2019-08-30

## (undated) RX ORDER — GABAPENTIN 300 MG/1
CAPSULE ORAL
Status: DISPENSED
Start: 2019-08-30

## (undated) RX ORDER — ONDANSETRON 2 MG/ML
INJECTION INTRAMUSCULAR; INTRAVENOUS
Status: DISPENSED
Start: 2019-08-30

## (undated) RX ORDER — FENTANYL CITRATE 50 UG/ML
INJECTION, SOLUTION INTRAMUSCULAR; INTRAVENOUS
Status: DISPENSED
Start: 2019-08-30

## (undated) RX ORDER — CEFAZOLIN SODIUM 1 G/3ML
INJECTION, POWDER, FOR SOLUTION INTRAMUSCULAR; INTRAVENOUS
Status: DISPENSED
Start: 2019-08-30

## (undated) RX ORDER — EPINEPHRINE 1 MG/ML
INJECTION, SOLUTION INTRAMUSCULAR; SUBCUTANEOUS
Status: DISPENSED
Start: 2019-08-30

## (undated) RX ORDER — BUPIVACAINE HYDROCHLORIDE 5 MG/ML
INJECTION, SOLUTION EPIDURAL; INTRACAUDAL
Status: DISPENSED
Start: 2019-08-30

## (undated) RX ORDER — DEXAMETHASONE SODIUM PHOSPHATE 4 MG/ML
INJECTION, SOLUTION INTRA-ARTICULAR; INTRALESIONAL; INTRAMUSCULAR; INTRAVENOUS; SOFT TISSUE
Status: DISPENSED
Start: 2019-08-30